# Patient Record
Sex: FEMALE | Race: WHITE | NOT HISPANIC OR LATINO
[De-identification: names, ages, dates, MRNs, and addresses within clinical notes are randomized per-mention and may not be internally consistent; named-entity substitution may affect disease eponyms.]

---

## 2018-06-15 ENCOUNTER — APPOINTMENT (OUTPATIENT)
Dept: OBGYN | Facility: CLINIC | Age: 33
End: 2018-06-15
Payer: COMMERCIAL

## 2018-06-15 ENCOUNTER — RESULT CHARGE (OUTPATIENT)
Age: 33
End: 2018-06-15

## 2018-06-15 ENCOUNTER — NON-APPOINTMENT (OUTPATIENT)
Age: 33
End: 2018-06-15

## 2018-06-15 VITALS
BODY MASS INDEX: 20 KG/M2 | WEIGHT: 132 LBS | HEIGHT: 68 IN | SYSTOLIC BLOOD PRESSURE: 120 MMHG | DIASTOLIC BLOOD PRESSURE: 82 MMHG

## 2018-06-15 DIAGNOSIS — Z78.9 OTHER SPECIFIED HEALTH STATUS: ICD-10-CM

## 2018-06-15 DIAGNOSIS — Z82.49 FAMILY HISTORY OF ISCHEMIC HEART DISEASE AND OTHER DISEASES OF THE CIRCULATORY SYSTEM: ICD-10-CM

## 2018-06-15 DIAGNOSIS — Z86.19 PERSONAL HISTORY OF OTHER INFECTIOUS AND PARASITIC DISEASES: ICD-10-CM

## 2018-06-15 LAB
BILIRUB UR QL STRIP: NORMAL
GLUCOSE UR-MCNC: NORMAL
HCG UR QL: 0.2 EU/DL
HCG UR QL: POSITIVE
HGB UR QL STRIP.AUTO: NORMAL
KETONES UR-MCNC: NORMAL
LEUKOCYTE ESTERASE UR QL STRIP: NORMAL
NITRITE UR QL STRIP: NORMAL
PH UR STRIP: 5.5
PROT UR STRIP-MCNC: NORMAL
SP GR UR STRIP: 1.02

## 2018-06-15 PROCEDURE — 76817 TRANSVAGINAL US OBSTETRIC: CPT

## 2018-06-15 PROCEDURE — 81003 URINALYSIS AUTO W/O SCOPE: CPT | Mod: QW

## 2018-06-15 PROCEDURE — 81025 URINE PREGNANCY TEST: CPT

## 2018-06-15 PROCEDURE — 0501F PRENATAL FLOW SHEET: CPT

## 2018-06-16 LAB
C TRACH RRNA SPEC QL NAA+PROBE: NOT DETECTED
N GONORRHOEA RRNA SPEC QL NAA+PROBE: NOT DETECTED
SOURCE AMPLIFICATION: NORMAL

## 2018-06-18 LAB
BACTERIA UR CULT: NORMAL
SOURCE AMPLIFICATION: NORMAL
T VAGINALIS RRNA SPEC QL NAA+PROBE: NOT DETECTED

## 2018-06-18 RX ORDER — VITAMIN C, CALCIUM, IRON, VITAMIN D3, VITAMIN E, VITAMIN B1, VITAMIN B2, VITAMIN B3, VITAMIN B6, FOLIC ACID, IODINE, ZINC, COPPER, DOCUSATE SODIUM, DOCOSAHEXAENOIC ACID (DHA) 27-1-50 MG
27-1 & 250 KIT ORAL
Qty: 1 | Refills: 11 | Status: DISCONTINUED | COMMUNITY
Start: 2018-06-15 | End: 2018-06-18

## 2018-06-25 DIAGNOSIS — R87.810 ATYPICAL SQUAMOUS CELLS OF UNDETERMINED SIGNIFICANCE ON CYTOLOGIC SMEAR OF CERVIX (ASC-US): ICD-10-CM

## 2018-06-25 DIAGNOSIS — J93.83 OTHER PNEUMOTHORAX: ICD-10-CM

## 2018-06-25 DIAGNOSIS — Z86.39 PERSONAL HISTORY OF OTHER ENDOCRINE, NUTRITIONAL AND METABOLIC DISEASE: ICD-10-CM

## 2018-06-25 DIAGNOSIS — R92.2 INCONCLUSIVE MAMMOGRAM: ICD-10-CM

## 2018-06-25 DIAGNOSIS — R79.89 OTHER SPECIFIED ABNORMAL FINDINGS OF BLOOD CHEMISTRY: ICD-10-CM

## 2018-06-25 DIAGNOSIS — R87.610 ATYPICAL SQUAMOUS CELLS OF UNDETERMINED SIGNIFICANCE ON CYTOLOGIC SMEAR OF CERVIX (ASC-US): ICD-10-CM

## 2018-06-25 DIAGNOSIS — B37.9 CANDIDIASIS, UNSPECIFIED: ICD-10-CM

## 2018-07-06 ENCOUNTER — APPOINTMENT (OUTPATIENT)
Dept: OBGYN | Facility: CLINIC | Age: 33
End: 2018-07-06
Payer: COMMERCIAL

## 2018-07-06 VITALS
DIASTOLIC BLOOD PRESSURE: 60 MMHG | BODY MASS INDEX: 20.16 KG/M2 | SYSTOLIC BLOOD PRESSURE: 100 MMHG | HEIGHT: 68 IN | WEIGHT: 133 LBS

## 2018-07-06 LAB
BILIRUB UR QL STRIP: NORMAL
GLUCOSE UR-MCNC: NORMAL
HCG UR QL: 0.2 EU/DL
HGB UR QL STRIP.AUTO: NORMAL
KETONES UR-MCNC: NORMAL
LEUKOCYTE ESTERASE UR QL STRIP: NORMAL
NITRITE UR QL STRIP: NORMAL
PH UR STRIP: 7
PROT UR STRIP-MCNC: NORMAL
SP GR UR STRIP: 1.01

## 2018-07-06 PROCEDURE — 36415 COLL VENOUS BLD VENIPUNCTURE: CPT

## 2018-07-06 PROCEDURE — 0502F SUBSEQUENT PRENATAL CARE: CPT

## 2018-07-06 PROCEDURE — 76801 OB US < 14 WKS SINGLE FETUS: CPT

## 2018-07-08 ENCOUNTER — NON-APPOINTMENT (OUTPATIENT)
Age: 33
End: 2018-07-08

## 2018-07-08 LAB
BASOPHILS # BLD AUTO: 0.02 K/UL
BASOPHILS NFR BLD AUTO: 0.2 %
CMV IGG SERPL QL: 0.37 U/ML
CMV IGG SERPL-IMP: NEGATIVE
EOSINOPHIL # BLD AUTO: 0.1 K/UL
EOSINOPHIL NFR BLD AUTO: 1.2 %
HBV SURFACE AG SERPL QL IA: NONREACTIVE
HCT VFR BLD CALC: 38.2 %
HCV AB SER QL: NONREACTIVE
HCV S/CO RATIO: 0.28 S/CO
HGB BLD-MCNC: 12.8 G/DL
HIV1+2 AB SPEC QL IA.RAPID: NONREACTIVE
IMM GRANULOCYTES NFR BLD AUTO: 0.6 %
LYMPHOCYTES # BLD AUTO: 2.78 K/UL
LYMPHOCYTES NFR BLD AUTO: 34.1 %
MAN DIFF?: NORMAL
MCHC RBC-ENTMCNC: 31 PG
MCHC RBC-ENTMCNC: 33.5 GM/DL
MCV RBC AUTO: 92.5 FL
MONOCYTES # BLD AUTO: 0.58 K/UL
MONOCYTES NFR BLD AUTO: 7.1 %
NEUTROPHILS # BLD AUTO: 4.63 K/UL
NEUTROPHILS NFR BLD AUTO: 56.8 %
PLATELET # BLD AUTO: 261 K/UL
RBC # BLD: 4.13 M/UL
RBC # FLD: 12.8 %
RUBV IGG FLD-ACNC: 2.2 INDEX
RUBV IGG SER-IMP: POSITIVE
T PALLIDUM AB SER QL IA: NEGATIVE
TSH SERPL-ACNC: 0.57 UIU/ML
VZV AB TITR SER: POSITIVE
VZV IGG SER IF-ACNC: 1324 INDEX
WBC # FLD AUTO: 8.16 K/UL

## 2018-07-09 LAB
ABO + RH PNL BLD: NORMAL
B19V IGG SER QL IA: 3.1 INDEX
B19V IGG+IGM SER-IMP: NORMAL
B19V IGG+IGM SER-IMP: POSITIVE
B19V IGM FLD-ACNC: 0.2 INDEX
B19V IGM SER-ACNC: NEGATIVE
BLD GP AB SCN SERPL QL: NORMAL

## 2018-07-10 LAB
HGB A MFR BLD: 96.9 %
HGB A2 MFR BLD: 2.5 %
HGB F MFR BLD: 0.6 %
HGB FRACT BLD-IMP: NORMAL

## 2018-07-12 LAB
FMR1 GENE MUT ANL BLD/T: NORMAL
LEAD BLD-MCNC: 1 UG/DL

## 2018-07-16 LAB
AR GENE MUT ANL BLD/T: NEGATIVE
CFTR MUT TESTED BLD/T: NEGATIVE

## 2018-07-18 LAB
CLARIM 15Q11.2: NORMAL
CLARIM 1P36: NORMAL
CLARIM 22Q11.2: NORMAL
CLARIM 4P-/WOLF-HIRSCHHORN: NORMAL
CLARIM 5P-/CRI DU CHAT: NORMAL
CLARIM ADDITIONAL INFO: NORMAL
CLARIM CHROMOSOME 13: NORMAL
CLARIM CHROMOSOME 18: NORMAL
CLARIM CHROMOSOME 21: NORMAL
CLARIM SEX CHROMOSOMES: NORMAL
CLARITEST NIPT W/MICRO: NORMAL

## 2018-08-03 ENCOUNTER — APPOINTMENT (OUTPATIENT)
Dept: OBGYN | Facility: CLINIC | Age: 33
End: 2018-08-03
Payer: COMMERCIAL

## 2018-08-03 ENCOUNTER — NON-APPOINTMENT (OUTPATIENT)
Age: 33
End: 2018-08-03

## 2018-08-03 VITALS
HEIGHT: 68 IN | SYSTOLIC BLOOD PRESSURE: 94 MMHG | DIASTOLIC BLOOD PRESSURE: 60 MMHG | BODY MASS INDEX: 20.31 KG/M2 | WEIGHT: 134 LBS

## 2018-08-03 PROCEDURE — 0502F SUBSEQUENT PRENATAL CARE: CPT

## 2018-08-11 ENCOUNTER — EMERGENCY (EMERGENCY)
Facility: HOSPITAL | Age: 33
LOS: 1 days | End: 2018-08-11
Attending: EMERGENCY MEDICINE
Payer: COMMERCIAL

## 2018-08-11 VITALS
DIASTOLIC BLOOD PRESSURE: 79 MMHG | RESPIRATION RATE: 16 BRPM | TEMPERATURE: 98 F | SYSTOLIC BLOOD PRESSURE: 125 MMHG | HEART RATE: 110 BPM | OXYGEN SATURATION: 99 %

## 2018-08-11 LAB
ALBUMIN SERPL ELPH-MCNC: 3.3 G/DL — SIGNIFICANT CHANGE UP (ref 3.3–5)
ALBUMIN SERPL ELPH-MCNC: 4.2 G/DL — SIGNIFICANT CHANGE UP (ref 3.3–5)
ALP SERPL-CCNC: 48 U/L — SIGNIFICANT CHANGE UP (ref 40–120)
ALP SERPL-CCNC: 57 U/L — SIGNIFICANT CHANGE UP (ref 40–120)
ALT FLD-CCNC: 20 U/L — SIGNIFICANT CHANGE UP (ref 10–45)
ALT FLD-CCNC: 21 U/L — SIGNIFICANT CHANGE UP (ref 10–45)
ANION GAP SERPL CALC-SCNC: 12 MMOL/L — SIGNIFICANT CHANGE UP (ref 5–17)
ANION GAP SERPL CALC-SCNC: 13 MMOL/L — SIGNIFICANT CHANGE UP (ref 5–17)
APTT BLD: 25.3 SEC — LOW (ref 27.5–37.4)
AST SERPL-CCNC: 20 U/L — SIGNIFICANT CHANGE UP (ref 10–40)
AST SERPL-CCNC: 21 U/L — SIGNIFICANT CHANGE UP (ref 10–40)
BASOPHILS # BLD AUTO: 0.1 K/UL — SIGNIFICANT CHANGE UP (ref 0–0.2)
BASOPHILS NFR BLD AUTO: 0.7 % — SIGNIFICANT CHANGE UP (ref 0–2)
BILIRUB SERPL-MCNC: 0.3 MG/DL — SIGNIFICANT CHANGE UP (ref 0.2–1.2)
BILIRUB SERPL-MCNC: 0.3 MG/DL — SIGNIFICANT CHANGE UP (ref 0.2–1.2)
BLD GP AB SCN SERPL QL: NEGATIVE — SIGNIFICANT CHANGE UP
BUN SERPL-MCNC: 11 MG/DL — SIGNIFICANT CHANGE UP (ref 7–23)
BUN SERPL-MCNC: 7 MG/DL — SIGNIFICANT CHANGE UP (ref 7–23)
CALCIUM SERPL-MCNC: 8.6 MG/DL — SIGNIFICANT CHANGE UP (ref 8.4–10.5)
CALCIUM SERPL-MCNC: 9 MG/DL — SIGNIFICANT CHANGE UP (ref 8.4–10.5)
CHLORIDE SERPL-SCNC: 104 MMOL/L — SIGNIFICANT CHANGE UP (ref 96–108)
CHLORIDE SERPL-SCNC: 97 MMOL/L — SIGNIFICANT CHANGE UP (ref 96–108)
CK MB CFR SERPL CALC: 1 NG/ML — SIGNIFICANT CHANGE UP (ref 0–3.8)
CK SERPL-CCNC: 42 U/L — SIGNIFICANT CHANGE UP (ref 25–170)
CO2 SERPL-SCNC: 20 MMOL/L — LOW (ref 22–31)
CO2 SERPL-SCNC: 20 MMOL/L — LOW (ref 22–31)
CREAT SERPL-MCNC: 0.46 MG/DL — LOW (ref 0.5–1.3)
CREAT SERPL-MCNC: 0.49 MG/DL — LOW (ref 0.5–1.3)
EOSINOPHIL # BLD AUTO: 0.2 K/UL — SIGNIFICANT CHANGE UP (ref 0–0.5)
EOSINOPHIL NFR BLD AUTO: 2 % — SIGNIFICANT CHANGE UP (ref 0–6)
GLUCOSE SERPL-MCNC: 100 MG/DL — HIGH (ref 70–99)
GLUCOSE SERPL-MCNC: 108 MG/DL — HIGH (ref 70–99)
HCT VFR BLD CALC: 37.4 % — SIGNIFICANT CHANGE UP (ref 34.5–45)
HGB BLD-MCNC: 13.1 G/DL — SIGNIFICANT CHANGE UP (ref 11.5–15.5)
INR BLD: 0.93 RATIO — SIGNIFICANT CHANGE UP (ref 0.88–1.16)
LYMPHOCYTES # BLD AUTO: 2.5 K/UL — SIGNIFICANT CHANGE UP (ref 1–3.3)
LYMPHOCYTES # BLD AUTO: 32.8 % — SIGNIFICANT CHANGE UP (ref 13–44)
MCHC RBC-ENTMCNC: 32.3 PG — SIGNIFICANT CHANGE UP (ref 27–34)
MCHC RBC-ENTMCNC: 35 GM/DL — SIGNIFICANT CHANGE UP (ref 32–36)
MCV RBC AUTO: 92.2 FL — SIGNIFICANT CHANGE UP (ref 80–100)
MONOCYTES # BLD AUTO: 0.5 K/UL — SIGNIFICANT CHANGE UP (ref 0–0.9)
MONOCYTES NFR BLD AUTO: 6.3 % — SIGNIFICANT CHANGE UP (ref 2–14)
NEUTROPHILS # BLD AUTO: 4.5 K/UL — SIGNIFICANT CHANGE UP (ref 1.8–7.4)
NEUTROPHILS NFR BLD AUTO: 58.2 % — SIGNIFICANT CHANGE UP (ref 43–77)
PLATELET # BLD AUTO: 230 K/UL — SIGNIFICANT CHANGE UP (ref 150–400)
POTASSIUM SERPL-MCNC: 3.3 MMOL/L — LOW (ref 3.5–5.3)
POTASSIUM SERPL-MCNC: 3.6 MMOL/L — SIGNIFICANT CHANGE UP (ref 3.5–5.3)
POTASSIUM SERPL-SCNC: 3.3 MMOL/L — LOW (ref 3.5–5.3)
POTASSIUM SERPL-SCNC: 3.6 MMOL/L — SIGNIFICANT CHANGE UP (ref 3.5–5.3)
PROT SERPL-MCNC: 6.4 G/DL — SIGNIFICANT CHANGE UP (ref 6–8.3)
PROT SERPL-MCNC: 7.4 G/DL — SIGNIFICANT CHANGE UP (ref 6–8.3)
PROTHROM AB SERPL-ACNC: 10 SEC — SIGNIFICANT CHANGE UP (ref 9.8–12.7)
RBC # BLD: 4.05 M/UL — SIGNIFICANT CHANGE UP (ref 3.8–5.2)
RBC # FLD: 12.3 % — SIGNIFICANT CHANGE UP (ref 10.3–14.5)
RH IG SCN BLD-IMP: POSITIVE — SIGNIFICANT CHANGE UP
SODIUM SERPL-SCNC: 129 MMOL/L — LOW (ref 135–145)
SODIUM SERPL-SCNC: 137 MMOL/L — SIGNIFICANT CHANGE UP (ref 135–145)
TROPONIN T, HIGH SENSITIVITY RESULT: <6 NG/L — SIGNIFICANT CHANGE UP (ref 0–51)
WBC # BLD: 7.7 K/UL — SIGNIFICANT CHANGE UP (ref 3.8–10.5)
WBC # FLD AUTO: 7.7 K/UL — SIGNIFICANT CHANGE UP (ref 3.8–10.5)

## 2018-08-11 PROCEDURE — 71045 X-RAY EXAM CHEST 1 VIEW: CPT | Mod: 26

## 2018-08-11 PROCEDURE — 93010 ELECTROCARDIOGRAM REPORT: CPT

## 2018-08-11 PROCEDURE — 70450 CT HEAD/BRAIN W/O DYE: CPT | Mod: 26

## 2018-08-11 PROCEDURE — 99220: CPT

## 2018-08-11 RX ORDER — METOCLOPRAMIDE HCL 10 MG
10 TABLET ORAL ONCE
Qty: 0 | Refills: 0 | Status: COMPLETED | OUTPATIENT
Start: 2018-08-11 | End: 2018-08-11

## 2018-08-11 RX ORDER — SODIUM CHLORIDE 9 MG/ML
1000 INJECTION INTRAMUSCULAR; INTRAVENOUS; SUBCUTANEOUS ONCE
Qty: 0 | Refills: 0 | Status: COMPLETED | OUTPATIENT
Start: 2018-08-11 | End: 2018-08-11

## 2018-08-11 RX ADMIN — SODIUM CHLORIDE 1000 MILLILITER(S): 9 INJECTION INTRAMUSCULAR; INTRAVENOUS; SUBCUTANEOUS at 15:41

## 2018-08-11 RX ADMIN — Medication 10 MILLIGRAM(S): at 13:43

## 2018-08-11 NOTE — ED CDU PROVIDER DISPOSITION NOTE - ATTENDING CONTRIBUTION TO CARE
Patient appears very well and comfortable. Exam benign. Imaging studies completed. Cleared by Neurology. Not in need of any additional emergent investigation or intervention at this time. Discharged with appropriate instruction, follow-up and a copy of her test results.

## 2018-08-11 NOTE — ED ADULT NURSE NOTE - ED STAT RN HANDOFF DETAILS
Bedside report given to on coming nurse Jadyon RN. Understands pmh, medications given and plan of care for patient. Patient in stable condition, vital signs updated, has no complaints at this time and has been updated on care plan. Explained to patient that it is change of shift and new nurse is taking over, pt verbalized understanding.

## 2018-08-11 NOTE — ED PROVIDER NOTE - PHYSICAL EXAMINATION
Clarita Louis, DO:   Gen: Well appearing, NAD  Head: NCAT  HEENT: PERRL, MMM, normal conjunctiva, anicteric, neck supple  Lung: CTAB, no adventitious sounds  CV: RRR, no murmurs  Abd: soft, NTND, no rebound or guarding, no CVAT  MSK: No edema, no visible deformities  Neuro: CN II-XII intact. 5/5 strength and normal sensation in all extremities. No dysmetria, no drift.   Skin: Warm and dry, no evidence of rash  Psych: normal mood and affect Clarita Louis, DO:   Gen: Well appearing, NAD  Head: NCAT  HEENT: PERRL, MMM, normal conjunctiva, anicteric, neck supple  Lung: CTAB, no adventitious sounds  CV: RRR, no murmurs  Abd: soft, NTND, no rebound or guarding, no CVAT  MSK: No edema, no visible deformities  Neuro: CN II-XII intact. 5/5 strength and normal sensation in all extremities. No dysmetria, no drift. NIHSS 1.   Skin: Warm and dry, no evidence of rash  Psych: normal mood and affect

## 2018-08-11 NOTE — ED CDU PROVIDER INITIAL DAY NOTE - PROGRESS NOTE DETAILS
CDU NOTE GALINA Whitley: VSS NAD. Patient is resting comfortably and is without any complaints. No acute events on tele. Neuro exam unchanged

## 2018-08-11 NOTE — ED ADULT NURSE NOTE - CHPI ED NUR SYMPTOMS NEG
no fever/no loss of consciousness/no vomiting/no blurred vision/no change in level of consciousness/no nausea/no dizziness/no confusion

## 2018-08-11 NOTE — ED ADULT NURSE REASSESSMENT NOTE - COMFORT CARE
darkened lights/po fluids offered/plan of care explained/ambulated to bathroom/repositioned/warm blanket provided

## 2018-08-11 NOTE — CONSULT NOTE ADULT - SUBJECTIVE AND OBJECTIVE BOX
HPI:  Patient is a 33 year old RH  female who presents with LUE and LLE tingling that began approx half hour prior to presentation as code stroke. she has PMHx of migraines and states she had headache last night and woke up with only headache this morning. She is 16 weeks pregnant. Has unsteadiness with walking, and states that both the tingling and walking have improved but still not back to baseline. Denies numbness, dizziness, change in vision, nausea, vomiting, diarrhea, recent illness or trauma. CT head read as negative, tPa not given due to improving symptoms. discussed with stroke attending, and risks vs benefits explained to patient who agreed with plan.   NIHSS 1, pre-MRS 0.     PAST MEDICAL & SURGICAL HISTORY:  No pertinent past medical history    FAMILY HISTORY:    Allergies  No Known Allergies    Review of Systems:  CONSTITUTIONAL:  No weight loss, fever, chills, weakness or fatigue.  HEENT:  Eyes:  No visual loss, blurred vision, double vision or yellow sclerae. Ears, Nose, Throat:  No hearing loss, sneezing, congestion, runny nose or sore throat.  CARDIOVASCULAR:  No chest pain, chest pressure or chest discomfort. No palpitations or edema.  GASTROINTESTINAL:  No anorexia, nausea, vomiting or diarrhea. No abdominal pain or blood.  NEUROLOGICAL: See HPI  MUSCULOSKELETAL:  No muscle, back pain, joint pain or stiffness.  PSYCHIATRIC:  No history of depression or anxiety.    Vital Signs Last 24 Hrs  T(C): 36.6 (11 Aug 2018 13:19), Max: 36.6 (11 Aug 2018 13:19)  T(F): 97.9 (11 Aug 2018 13:19), Max: 97.9 (11 Aug 2018 13:19)  HR: 95 (11 Aug 2018 13:26) (95 - 110)  BP: 119/86 (11 Aug 2018 13:26) (119/86 - 125/79)  BP(mean): --  RR: 16 (11 Aug 2018 13:26) (16 - 16)  SpO2: 100% (11 Aug 2018 13:26) (99% - 100%)    General Exam:   General appearance: No acute distress                 Neurological Exam:  Mental Status: Orientated to self, date and place.    No dysarthria, aphasia or neglect.      Cranial Nerves: CN I - not tested.  PERRL, EOMI, VFF, no nystagmus or diplopia.  No APD.    Fundi not visualized bilaterally.    No facial asymmetry.  Hearing intact to finger rub bilaterally.     Motor:   Tone: normal.                  Strength:     [] Upper extremity                      Delt       Bicep    Tricep                                                  R         5/5        5/5        5/5       5/5                                               L          5/5        5/5        5/5       5/5  [] Lower extremity                       HF          KE          KF        DF         PF                                               R        5/5 5/5        5/5       5/5       5/5                                               L         5/5 5/5 5/5       5/5        5/5  Pronator drift: none                 Dysmetria: BL NL FTN and heel to shin  No truncal ataxia.    Tremor: No resting, postural or action tremor.  No myoclonus.    Sensation: intact to light touch, pinprick, vibration and proprioception    Deep Tendon Reflexes:   Right 2+ : BC, TC, BRD   Left 2+ : BC, TC, BRD  Right 2+ Knee, 1+ ankle  Left 2+ Knee, 1+ ankle    Toes flexor bilaterally  Gait: unsteady due to slight left lower extremity weakness, but improving.     Other:    Radiology  CT head: negative  MRI  EKG:  tele:  TTE:  EEG: HPI:  Patient is a 33 year old RH  female who presents with LUE and LLE tingling that began approx half hour prior to presentation as code stroke. she has PMHx of migraines and states she had headache last night and woke up with only headache this morning. She is 16 weeks pregnant. Has unsteadiness with walking, and states that both the tingling and walking have improved but still not back to baseline. Denies numbness, dizziness, change in vision, nausea, vomiting, diarrhea, recent illness or trauma. CT head read as negative, tPa not given due to improving symptoms. discussed with stroke attending, and risks vs benefits explained to patient who agreed with plan.   NIHSS 1, pre-MRS 0.     PAST MEDICAL & SURGICAL HISTORY:  No pertinent past medical history    FAMILY HISTORY:    Allergies  No Known Allergies    Review of Systems:  CONSTITUTIONAL:  No weight loss, fever, chills, weakness or fatigue.  HEENT:  Eyes:  No visual loss, blurred vision, double vision or yellow sclerae. Ears, Nose, Throat:  No hearing loss, sneezing, congestion, runny nose or sore throat.  CARDIOVASCULAR:  No chest pain, chest pressure or chest discomfort. No palpitations or edema.  GASTROINTESTINAL:  No anorexia, nausea, vomiting or diarrhea. No abdominal pain or blood.  NEUROLOGICAL: See HPI  MUSCULOSKELETAL:  No muscle, back pain, joint pain or stiffness.  PSYCHIATRIC:  No history of depression or anxiety.    Vital Signs Last 24 Hrs  T(C): 36.6 (11 Aug 2018 13:19), Max: 36.6 (11 Aug 2018 13:19)  T(F): 97.9 (11 Aug 2018 13:19), Max: 97.9 (11 Aug 2018 13:19)  HR: 95 (11 Aug 2018 13:26) (95 - 110)  BP: 119/86 (11 Aug 2018 13:26) (119/86 - 125/79)  BP(mean): --  RR: 16 (11 Aug 2018 13:26) (16 - 16)  SpO2: 100% (11 Aug 2018 13:26) (99% - 100%)    General Exam:   General appearance: No acute distress                 Neurological Exam:   Mental Status: Orientated to self, date and place.    No dysarthria, aphasia or neglect.      Cranial Nerves: CN I - not tested.  PERRL, EOMI, VFF, no nystagmus or diplopia.  No APD.    Fundi not visualized bilaterally.    No facial asymmetry.  Hearing intact to finger rub bilaterally.     Motor:   Tone: normal.                  Strength:     [] Upper extremity                      Delt       Bicep    Tricep                                                  R         5/5        5/5        5/5       5/5                                               L          5/5        5/5        5/5       5/5  [] Lower extremity                       HF          KE          KF        DF         PF                                               R        5/5 5/5        5/5       5/5       5/5                                               L         5/5 5/5 5/5       5/5        5/5  Pronator drift: none                 Dysmetria: BL NL FTN and heel to shin  No truncal ataxia.    Tremor: No resting, postural or action tremor.  No myoclonus.    Sensation: intact to light touch, pinprick, vibration and proprioception    Deep Tendon Reflexes:   Right 2+ : BC, TC, BRD   Left 2+ : BC, TC, BRD  Right 2+ Knee, 1+ ankle  Left 2+ Knee, 1+ ankle    Toes flexor bilaterally  Gait: unsteady due to slight left lower extremity weakness, but improving.     Other:    Radiology  CT head: negative  MRI  EKG:  tele:  TTE:  EEG:

## 2018-08-11 NOTE — CONSULT NOTE ADULT - ASSESSMENT
Patient is a 33 year old RH  female who presents with LUE and LLE tingling that began approx half hour prior to presentation as code stroke. she has PMHx of migraines and states she had headache last night and woke up with only headache this morning. She is 16 weeks pregnant. Has unsteadiness with walking, and states that both the tingling and walking have improved but still not back to baseline. Denies numbness, dizziness, change in vision, nausea, vomiting, diarrhea, recent illness or trauma. CT head read as negative, tPa not given due to improving symptoms. discussed with stroke attending, and risks vs benefits explained to patient who agreed with plan.   NIHSS 1, pre-MRS 0.   Etiology likely migraine vs less likely stroke.    Plan  [] CDU  [] MRI brain, MRA head and neck WITHOUT contrast, MRV without contrast  [] stat CT head if mentation changes or if has decline on exam.   [] neuro checks  [] resume home medications  [] bedside swallow evaluation  [] PT/OT Patient is a 33 year old RH  female who presents with LUE and LLE tingling that began approx half hour prior to presentation as code stroke. she has PMHx of migraines and states she had headache last night and woke up with only headache this morning. She is 16 weeks pregnant. Has unsteadiness with walking, and states that both the tingling and walking have improved but still not back to baseline. Denies numbness, dizziness, change in vision, nausea, vomiting, diarrhea, recent illness or trauma. CT head read as negative, tPa not given due to improving symptoms. discussed with stroke attending, and risks vs benefits explained to patient who agreed with plan.   NIHSS 1, pre-MRS 0.   Etiology likely migraine vs less likely stroke.    Plan  [] CDU  [] MRI brain, MRA head and neck WITHOUT contrast, MRV without contrast  [] migraine headache: IVF, magnesium, tylenol  [] stat CT head if mentation changes or if has decline on exam.   [] neuro checks  [] resume home medications  [] bedside swallow evaluation  [] PT/OT

## 2018-08-11 NOTE — ED PROVIDER NOTE - OBJECTIVE STATEMENT
Clarita Louis, DO: 33 16 weeks F pregnant F with hx of migraines here for LUE & LLE weakness and numbness associated with HA 30 mins pta. No hx of trauma. No nausea/vomiting. FS 99. Clarita Louis, DO: 33 R hand dominate 16 weeks F pregnant F with hx of migraines here for LUE & LLE weakness and numbness associated with HA 30 mins pta. No hx of trauma. No nausea/vomiting. FS 99.

## 2018-08-11 NOTE — ED CDU PROVIDER DISPOSITION NOTE - PLAN OF CARE
1) Follow-up with your primary medical doctor in 2-3 days. Additionally follow-up with neurology in 2-3 days, you may follow-up with the neurology clinic [phone].  2) Take all medication as directed.  3) Rest and drink plenty of fluids.  4) Return to the ER if symptoms persist or worsen, or if you experience weakness, numbness, difficulty speaking/swallowing/walking, dizziness, lightheadedness, passing out (losing consciousness), vomiting, severe headache, change in vision, or if any other concerning symptoms.

## 2018-08-11 NOTE — ED PROVIDER NOTE - ATTENDING CONTRIBUTION TO CARE
32 yo female hx of Migraines presents to ED for left sided weakness and decreased sensation x 30 min prior to arrival. Code stroke called upon arrival. Pt endorses mild HA that she woke up with the morning. Pt 16 weeks pregnant and only taking tylenol at home for migraines without relief. No hx of similar type migraines in the past, usually just HA that resolves with medication. Pt denies trauma. Symptoms improving but still present, difficulty walking present. No fevers, no neck pain, no chest pain. PE: Anxious but well appearing, MMM, RRR, lungs clear, Ab soft, gravid uterus, nontender, Strength intact b/l. Decreased sensation to LUE and LLE, difficulty ambulating. A/P: 32 yo female presents with left sided subjective weakness and numbness x 30 min. CODE stroke called. Likely migraine however will obtain CT scan and appreciate neuro recs, likely needing MRI. Will also obtain FS, labs, analgesia, FHR and reevaluate.

## 2018-08-11 NOTE — ED PROVIDER NOTE - CARE PLAN
Principal Discharge DX:	Weakness Principal Discharge DX:	Hemiplegic migraine without status migrainosus, not intractable

## 2018-08-11 NOTE — ED ADULT NURSE REASSESSMENT NOTE - NS ED NURSE REASSESS COMMENT FT1
18.45 Received the PT from CC from [facility] For  weakness. Pt is oriented to the unit Uzma headache /N/V/Fever  chills cp sob  Pt feels better with left sided weakness Comfort care & safety measures initiated  continue to monitor
Received pt from [facility],  received pt alert and responsive, oriented x4, denies any respiratory distress, SOB, or difficulty breathing. Pt transferred to CDU for L sided numbness which has since resolved also c/o mild headache but declined pain medication at this ti me. Neuro intact no deficits noted. P tis pending MRI/ MRA, MRV. On tele SR on monitor hr: 80's.  IV in place, patent and free of signs of infiltration, pt denies chest pain or palpitations, V/S stable, pt afebrile,  Pt educated on unit and unit rules, instructed patient to notify RN of any needed assistance, Pt verbalizes understanding, Call bell placed within reach. Safety maintained. Will continue to monitor. Family at bedside.

## 2018-08-11 NOTE — ED CDU PROVIDER INITIAL DAY NOTE - FAMILY HISTORY
Mother  Still living? Unknown  Family history of migraine, Age at diagnosis: Age Unknown  Family history of acute myocardial infarction, Age at diagnosis: Age Unknown

## 2018-08-11 NOTE — ED CDU PROVIDER INITIAL DAY NOTE - OBJECTIVE STATEMENT
34 y/o F  16 wks pregnant, R hand dominant, with h/o migraines (saw neuro 2 years ago, no regular f/u, only uses OTC meds with minimal relief) and spontaneous PTX in  presents to ED c/o L sided paresthesias (face, LUE and LLE), LLE/LUE weakness without facial droop, difficulty ambulating, and change in vision in L eye lasting approximately 2 hours and then resolving in the ED without intervention. Pt also had onset of her typical R sided migraine HA about 30 minutes after onset of other sxs described as in her R neck radiating up to R temple. At the time of CDU PA interview pt had only 4/10 dull HA as described previously without photophobia or nuchal rigidity, no palliative or provoking factors. Pt denies f/c, trauma/falls, CP, abdo pain, back pain, n/v, difficulty speaking/swallowing, dizziness, lightheadedness, LOC, difficulty urinating, current numbness/paresthesias/weakness or difficulty ambulating, h/o cardiac dysrhythmia, personal or FH/o CVA.  In ED: Pt was code stroke. CBC wnl, coags and CMP non-actionable, CTH wnl, CXR wnl. Sent to CDU as per neuro for MRI/MRA/MRV, PT/OT eval, neuro checks and frequent eval.

## 2018-08-11 NOTE — ED PROVIDER NOTE - PROGRESS NOTE DETAILS
Clarita Louis DO: Patient consent for CT - risk/benefits discussed and pt consented.  Attending Marcello Shane DO

## 2018-08-11 NOTE — ED CDU PROVIDER INITIAL DAY NOTE - ATTENDING CONTRIBUTION TO CARE
32 yo female presents with left sided weakness and numbness, CODE stroke called upon arrival. Symptoms improving. Likely miragine. CT neg, labs unremarkable. Placed in CDU for MRI/MRA/MRV and continued monitoring/neuro checks. If MR negative and patient improved, will discharge with neuro follow up.

## 2018-08-11 NOTE — ED ADULT NURSE NOTE - OBJECTIVE STATEMENT
34 y/o female presents to ed c/o sudden onset of left sided weakness/ pins and needles feeling 30 minutes PTA. Hx of migraines. 16 weeks pregnant. States she woke up with headache no different then normal. Code stroke initiated at charge 13:22. Please refer to neuro flow sheet. Denies chest pain, sob, n/v/d, abdominal pain, f/c, urinary symptoms, hematuria. A&Ox4, vss, skin warm dry and intact, MAEx4, lungs CTA, abd soft nondistended. Pt resting comfortably with VSS, no complaints at this time. Patient's bed in the lowest position, explained plan of care to patient and family members. Will continue to reassess. 34 y/o female presents to ed c/o sudden onset of left sided weakness/ pins and needles feeling 30 minutes PTA. Hx of migraines. 16 weeks pregnant. States she woke up with headache no different then normal. Code stroke initiated at charge 13:22. Please refer to neuro flow sheet. Denies chest pain, sob, n/v/d, abdominal pain, f/c, urinary symptoms, hematuria. A&Ox4, vss, skin warm dry and intact, MAEx4, lungs CTA, abd soft nondistended. Pt resting comfortably with VSS, no complaints at this time. Patient's bed in the lowest position, explained plan of care to patient and family members. Will continue to reassess.  - please refer to neuro flow sheet

## 2018-08-11 NOTE — ED CDU PROVIDER DISPOSITION NOTE - CLINICAL COURSE
32 y/o F  16 wks pregnant, R hand dominant, with h/o migraines (saw neuro 2 years ago, no regular f/u, only uses OTC meds with minimal relief) and spontaneous PTX in  presents to ED c/o L sided paresthesias (face, LUE and LLE), LLE/LUE weakness without facial droop, difficulty ambulating, and change in vision in L eye lasting approximately 2 hours and then resolving in the ED without intervention. Pt also had onset of her typical R sided migraine HA about 30 minutes after onset of other sxs described as in her R neck radiating up to R temple. At the time of CDU PA interview pt had only 4/10 dull HA as described previously without photophobia or nuchal rigidity, no palliative or provoking factors. Pt denies f/c, trauma/falls, CP, abdo pain, back pain, n/v, difficulty speaking/swallowing, dizziness, lightheadedness, LOC, difficulty urinating, current numbness/paresthesias/weakness or difficulty ambulating, h/o cardiac dysrhythmia, personal or FH/o CVA.  In ED: Pt was code stroke. CBC wnl, coags and CMP non-actionable, CTH wnl, CXR wnl. Sent to CDU as per neuro for MRI/MRA/MRV, PT/OT eval, neuro checks and frequent eval. 34 y/o F  16 wks pregnant, R hand dominant, with h/o migraines (saw neuro 2 years ago, no regular f/u, only uses OTC meds with minimal relief) and spontaneous PTX in  presents to ED c/o L sided paresthesias (face, LUE and LLE), LLE/LUE weakness without facial droop, difficulty ambulating, and change in vision in L eye lasting approximately 2 hours and then resolving in the ED without intervention. Pt also had onset of her typical R sided migraine HA about 30 minutes after onset of other sxs described as in her R neck radiating up to R temple. At the time of CDU PA interview pt had only 4/10 dull HA as described previously without photophobia or nuchal rigidity, no palliative or provoking factors. Pt denies f/c, trauma/falls, CP, abdo pain, back pain, n/v, difficulty speaking/swallowing, dizziness, lightheadedness, LOC, difficulty urinating, current numbness/paresthesias/weakness or difficulty ambulating, h/o cardiac dysrhythmia, personal or FH/o CVA.  In ED: Pt was code stroke. CBC wnl, coags and CMP non-actionable, CTH wnl, CXR wnl. Sent to CDU as per neuro for MRI/MRA/MRV, neuro checks and frequent eval. MR showed ventricles and sulci are unremarkable, there are few tiny foci of hyperintense T2 signal in the white matter likely minimal microvascular disease or sequela of migraines. No hemorrhage or midline shift. MR venography demonstrates patency of the superior sagittal sinus, straight sinus, paired transverse and sigmoid sinus. Intracranial/extracranial MR angio unremarkable. Case discussed with stroke team Dr. Julio C Torres spectra [id number] who recommended patient safe to be discharged home with neuro follow up. ED attending Dr. Boggs aware of recommendations and agreed

## 2018-08-12 VITALS
HEART RATE: 96 BPM | TEMPERATURE: 99 F | RESPIRATION RATE: 18 BRPM | SYSTOLIC BLOOD PRESSURE: 110 MMHG | DIASTOLIC BLOOD PRESSURE: 79 MMHG | OXYGEN SATURATION: 100 %

## 2018-08-12 LAB
CHOLEST SERPL-MCNC: 245 MG/DL — HIGH (ref 10–199)
HBA1C BLD-MCNC: 4.8 % — SIGNIFICANT CHANGE UP (ref 4–5.6)
HDLC SERPL-MCNC: 92 MG/DL — SIGNIFICANT CHANGE UP (ref 40–125)
LIPID PNL WITH DIRECT LDL SERPL: 140 MG/DL — HIGH
TOTAL CHOLESTEROL/HDL RATIO MEASUREMENT: 2.7 RATIO — LOW (ref 3.3–7.1)
TRIGL SERPL-MCNC: 66 MG/DL — SIGNIFICANT CHANGE UP (ref 10–149)

## 2018-08-12 PROCEDURE — 71045 X-RAY EXAM CHEST 1 VIEW: CPT

## 2018-08-12 PROCEDURE — 80061 LIPID PANEL: CPT

## 2018-08-12 PROCEDURE — G0378: CPT

## 2018-08-12 PROCEDURE — 93005 ELECTROCARDIOGRAM TRACING: CPT

## 2018-08-12 PROCEDURE — 99217: CPT

## 2018-08-12 PROCEDURE — 85027 COMPLETE CBC AUTOMATED: CPT

## 2018-08-12 PROCEDURE — 83036 HEMOGLOBIN GLYCOSYLATED A1C: CPT

## 2018-08-12 PROCEDURE — 84484 ASSAY OF TROPONIN QUANT: CPT

## 2018-08-12 PROCEDURE — 82550 ASSAY OF CK (CPK): CPT

## 2018-08-12 PROCEDURE — 70551 MRI BRAIN STEM W/O DYE: CPT

## 2018-08-12 PROCEDURE — 86850 RBC ANTIBODY SCREEN: CPT

## 2018-08-12 PROCEDURE — 96374 THER/PROPH/DIAG INJ IV PUSH: CPT

## 2018-08-12 PROCEDURE — 70450 CT HEAD/BRAIN W/O DYE: CPT

## 2018-08-12 PROCEDURE — 86901 BLOOD TYPING SEROLOGIC RH(D): CPT

## 2018-08-12 PROCEDURE — 82553 CREATINE MB FRACTION: CPT

## 2018-08-12 PROCEDURE — 85610 PROTHROMBIN TIME: CPT

## 2018-08-12 PROCEDURE — 99284 EMERGENCY DEPT VISIT MOD MDM: CPT | Mod: 25

## 2018-08-12 PROCEDURE — 86900 BLOOD TYPING SEROLOGIC ABO: CPT

## 2018-08-12 PROCEDURE — 80053 COMPREHEN METABOLIC PANEL: CPT

## 2018-08-12 PROCEDURE — 70551 MRI BRAIN STEM W/O DYE: CPT | Mod: 26

## 2018-08-12 PROCEDURE — 70547 MR ANGIOGRAPHY NECK W/O DYE: CPT

## 2018-08-12 PROCEDURE — 82962 GLUCOSE BLOOD TEST: CPT

## 2018-08-12 PROCEDURE — 70544 MR ANGIOGRAPHY HEAD W/O DYE: CPT

## 2018-08-12 PROCEDURE — 70547 MR ANGIOGRAPHY NECK W/O DYE: CPT | Mod: 26

## 2018-08-12 PROCEDURE — 85730 THROMBOPLASTIN TIME PARTIAL: CPT

## 2018-08-12 RX ORDER — ACETAMINOPHEN 500 MG
650 TABLET ORAL ONCE
Qty: 0 | Refills: 0 | Status: COMPLETED | OUTPATIENT
Start: 2018-08-12 | End: 2018-08-12

## 2018-08-12 RX ADMIN — Medication 650 MILLIGRAM(S): at 00:32

## 2018-08-12 RX ADMIN — Medication 650 MILLIGRAM(S): at 01:18

## 2018-08-12 NOTE — ED CDU PROVIDER SUBSEQUENT DAY NOTE - PHYSICAL EXAMINATION
CONSTITUTIONAL: Patient is awake, alert and oriented x 3. Patient is well appearing and in no acute distress.  HEAD: NCAT,   EYES: PERRL b/l, EOMI,   ENT:Airway patent, Nasal mucosa clear. Mouth with normal mucosa. Throat has no vesicles, no oropharyngeal exudates and uvula is midline.  NECK: supple,   LUNGS: CTA B/L,  HEART: RRR.+S1S2 no murmurs,   ABDOMEN: Soft nd/nt+bs no rebound or guarding.   EXTREMITY: no edema or calf tenderness b/l, FROM upper and lower ext b/l  SKIN: with no rash or lesions.   NEURO: Cn3-12 grossly intact. Strength5/5UE/LE.NmlSensation.Gait normal.

## 2018-08-12 NOTE — ED CDU PROVIDER SUBSEQUENT DAY NOTE - HISTORY
CDU NOTE GALINA Whitley: VSS NAD. Patient is resting comfortably and is without any complaints. No acute events on tele. Neuro exam remains unchanged. Pending MRI/MRA/MRV in am. Neurology following

## 2018-08-12 NOTE — ED CDU PROVIDER SUBSEQUENT DAY NOTE - PROGRESS NOTE DETAILS
CDU NOTE GALINA Whitley: VSS NAD. Patient is resting comfortably and is without any complaints. No acute events on tele. Neuro exam remains unchanged GALINA Valerio: Patient seen at bedside in NAD.  VSS.  Patient resting comfortably without complaints. Neurosensory exam intact, no defecits. Patient stated left sided numbness has resolved. Patient pending MRI and neuro recommendations GALINA Valerio: Case discussed with stroke team Dr. Julio C Torres spectra [phone] who recommended patient safe to be discharged home with neuro follow up. ED attending Dr. Boggs aware of recommendations and agreed.

## 2018-08-31 ENCOUNTER — APPOINTMENT (OUTPATIENT)
Dept: OBGYN | Facility: CLINIC | Age: 33
End: 2018-08-31
Payer: COMMERCIAL

## 2018-08-31 VITALS
BODY MASS INDEX: 21.07 KG/M2 | SYSTOLIC BLOOD PRESSURE: 120 MMHG | WEIGHT: 139 LBS | HEIGHT: 68 IN | DIASTOLIC BLOOD PRESSURE: 76 MMHG

## 2018-08-31 LAB
BILIRUB UR QL STRIP: NORMAL
GLUCOSE UR-MCNC: NORMAL
HCG UR QL: 0.2 EU/DL
HGB UR QL STRIP.AUTO: NORMAL
KETONES UR-MCNC: NORMAL
LEUKOCYTE ESTERASE UR QL STRIP: NORMAL
NITRITE UR QL STRIP: NORMAL
PH UR STRIP: 6
PROT UR STRIP-MCNC: NORMAL
SP GR UR STRIP: 1

## 2018-08-31 PROCEDURE — 0502F SUBSEQUENT PRENATAL CARE: CPT

## 2018-09-02 ENCOUNTER — NON-APPOINTMENT (OUTPATIENT)
Age: 33
End: 2018-09-02

## 2018-09-04 LAB
2ND TRIMESTER DATA: NORMAL
AFP PNL SERPL: NORMAL
AFP SERPL-ACNC: NORMAL
CLINICAL BIOCHEMIST REVIEW: NORMAL
NOTES NTD: NORMAL

## 2018-09-11 ENCOUNTER — ASOB RESULT (OUTPATIENT)
Age: 33
End: 2018-09-11

## 2018-09-11 ENCOUNTER — APPOINTMENT (OUTPATIENT)
Dept: ANTEPARTUM | Facility: CLINIC | Age: 33
End: 2018-09-11
Payer: COMMERCIAL

## 2018-09-11 PROCEDURE — 76805 OB US >/= 14 WKS SNGL FETUS: CPT

## 2018-10-02 ENCOUNTER — APPOINTMENT (OUTPATIENT)
Dept: OBGYN | Facility: CLINIC | Age: 33
End: 2018-10-02
Payer: COMMERCIAL

## 2018-10-02 VITALS
BODY MASS INDEX: 22.58 KG/M2 | HEIGHT: 68 IN | SYSTOLIC BLOOD PRESSURE: 110 MMHG | WEIGHT: 149 LBS | DIASTOLIC BLOOD PRESSURE: 70 MMHG

## 2018-10-02 DIAGNOSIS — Z34.01 ENCOUNTER FOR SUPERVISION OF NORMAL FIRST PREGNANCY, FIRST TRIMESTER: ICD-10-CM

## 2018-10-02 LAB
BILIRUB UR QL STRIP: NORMAL
GLUCOSE UR-MCNC: NORMAL
HCG UR QL: 0.2 EU/DL
HGB UR QL STRIP.AUTO: NORMAL
KETONES UR-MCNC: NORMAL
LEUKOCYTE ESTERASE UR QL STRIP: NORMAL
NITRITE UR QL STRIP: NORMAL
PH UR STRIP: 6.5
PROT UR STRIP-MCNC: NORMAL
SP GR UR STRIP: 1

## 2018-10-02 PROCEDURE — 90656 IIV3 VACC NO PRSV 0.5 ML IM: CPT

## 2018-10-02 PROCEDURE — 90471 IMMUNIZATION ADMIN: CPT

## 2018-10-02 PROCEDURE — 0502F SUBSEQUENT PRENATAL CARE: CPT

## 2018-10-03 ENCOUNTER — NON-APPOINTMENT (OUTPATIENT)
Age: 33
End: 2018-10-03

## 2018-10-03 PROBLEM — Z34.01 ENCOUNTER FOR PRENATAL CARE OF FIRST PREGNANCY, ANTEPARTUM, FIRST TRIMESTER: Status: RESOLVED | Noted: 2018-06-15 | Resolved: 2018-10-03

## 2018-10-04 ENCOUNTER — NON-APPOINTMENT (OUTPATIENT)
Age: 33
End: 2018-10-04

## 2018-10-08 ENCOUNTER — APPOINTMENT (OUTPATIENT)
Dept: ANTEPARTUM | Facility: CLINIC | Age: 33
End: 2018-10-08
Payer: COMMERCIAL

## 2018-10-08 ENCOUNTER — ASOB RESULT (OUTPATIENT)
Age: 33
End: 2018-10-08

## 2018-10-08 PROCEDURE — 76816 OB US FOLLOW-UP PER FETUS: CPT

## 2018-10-30 ENCOUNTER — APPOINTMENT (OUTPATIENT)
Dept: OBGYN | Facility: CLINIC | Age: 33
End: 2018-10-30
Payer: COMMERCIAL

## 2018-10-30 ENCOUNTER — NON-APPOINTMENT (OUTPATIENT)
Age: 33
End: 2018-10-30

## 2018-10-30 VITALS
SYSTOLIC BLOOD PRESSURE: 130 MMHG | BODY MASS INDEX: 23.04 KG/M2 | WEIGHT: 152 LBS | HEIGHT: 68 IN | DIASTOLIC BLOOD PRESSURE: 84 MMHG

## 2018-10-30 PROCEDURE — 36415 COLL VENOUS BLD VENIPUNCTURE: CPT

## 2018-10-30 PROCEDURE — 0502F SUBSEQUENT PRENATAL CARE: CPT

## 2018-10-31 LAB
BASOPHILS # BLD AUTO: 0.03 K/UL
BASOPHILS NFR BLD AUTO: 0.3 %
BILIRUB UR QL STRIP: NORMAL
EOSINOPHIL # BLD AUTO: 0.25 K/UL
EOSINOPHIL NFR BLD AUTO: 2.9 %
GLUCOSE 1H P 100 G GLC PO SERPL-MCNC: 154 MG/DL
GLUCOSE UR-MCNC: NORMAL
HCG UR QL: 0.2 EU/DL
HCT VFR BLD CALC: 34 %
HGB BLD-MCNC: 11.9 G/DL
HGB UR QL STRIP.AUTO: NORMAL
IMM GRANULOCYTES NFR BLD AUTO: 1.3 %
KETONES UR-MCNC: NORMAL
LEUKOCYTE ESTERASE UR QL STRIP: NORMAL
LYMPHOCYTES # BLD AUTO: 2.61 K/UL
LYMPHOCYTES NFR BLD AUTO: 30.2 %
MAN DIFF?: NORMAL
MCHC RBC-ENTMCNC: 32.9 PG
MCHC RBC-ENTMCNC: 35 GM/DL
MCV RBC AUTO: 93.9 FL
MONOCYTES # BLD AUTO: 0.59 K/UL
MONOCYTES NFR BLD AUTO: 6.8 %
NEUTROPHILS # BLD AUTO: 5.05 K/UL
NEUTROPHILS NFR BLD AUTO: 58.5 %
NITRITE UR QL STRIP: NORMAL
PH UR STRIP: 6.5
PLATELET # BLD AUTO: 224 K/UL
PROT UR STRIP-MCNC: NORMAL
RBC # BLD: 3.62 M/UL
RBC # FLD: 13.1 %
SP GR UR STRIP: 1.01
WBC # FLD AUTO: 8.64 K/UL

## 2018-11-04 ENCOUNTER — NON-APPOINTMENT (OUTPATIENT)
Age: 33
End: 2018-11-04

## 2018-11-05 ENCOUNTER — APPOINTMENT (OUTPATIENT)
Dept: ANTEPARTUM | Facility: CLINIC | Age: 33
End: 2018-11-05
Payer: COMMERCIAL

## 2018-11-05 ENCOUNTER — ASOB RESULT (OUTPATIENT)
Age: 33
End: 2018-11-05

## 2018-11-05 PROCEDURE — 76816 OB US FOLLOW-UP PER FETUS: CPT

## 2018-11-12 LAB
GLUCOSE 1H P 100 G GLC PO SERPL-MCNC: 173 MG/DL
GLUCOSE 2H P CHAL SERPL-MCNC: 157 MG/DL
GLUCOSE 3H P CHAL SERPL-MCNC: 109 MG/DL
GLUCOSE BS SERPL-MCNC: 82 MG/DL

## 2018-11-27 ENCOUNTER — APPOINTMENT (OUTPATIENT)
Dept: OBGYN | Facility: CLINIC | Age: 33
End: 2018-11-27
Payer: COMMERCIAL

## 2018-11-27 VITALS
DIASTOLIC BLOOD PRESSURE: 86 MMHG | BODY MASS INDEX: 23.95 KG/M2 | WEIGHT: 158 LBS | SYSTOLIC BLOOD PRESSURE: 140 MMHG | HEIGHT: 68 IN

## 2018-11-27 LAB
BILIRUB UR QL STRIP: NORMAL
GLUCOSE UR-MCNC: NORMAL
HCG UR QL: 0.2 EU/DL
HGB UR QL STRIP.AUTO: NORMAL
KETONES UR-MCNC: 15
LEUKOCYTE ESTERASE UR QL STRIP: NORMAL
NITRITE UR QL STRIP: NORMAL
PH UR STRIP: 6
PROT UR STRIP-MCNC: NORMAL
SP GR UR STRIP: 1.02

## 2018-11-27 PROCEDURE — 90715 TDAP VACCINE 7 YRS/> IM: CPT

## 2018-11-27 PROCEDURE — 90471 IMMUNIZATION ADMIN: CPT

## 2018-11-27 PROCEDURE — 0502F SUBSEQUENT PRENATAL CARE: CPT

## 2018-11-29 ENCOUNTER — NON-APPOINTMENT (OUTPATIENT)
Age: 33
End: 2018-11-29

## 2018-11-29 LAB
ALBUMIN SERPL ELPH-MCNC: 3.5 G/DL
ALP BLD-CCNC: 165 U/L
ALT SERPL-CCNC: 17 U/L
ANION GAP SERPL CALC-SCNC: 11 MMOL/L
APPEARANCE: CLEAR
APTT BLD: 26.7 SEC
AST SERPL-CCNC: 23 U/L
BACTERIA: ABNORMAL
BASOPHILS # BLD AUTO: 0.02 K/UL
BASOPHILS NFR BLD AUTO: 0.2 %
BILIRUB SERPL-MCNC: <0.2 MG/DL
BILIRUBIN URINE: NEGATIVE
BLOOD URINE: NEGATIVE
BUN SERPL-MCNC: 8 MG/DL
CALCIUM SERPL-MCNC: 9 MG/DL
CHLORIDE SERPL-SCNC: 100 MMOL/L
CO2 SERPL-SCNC: 22 MMOL/L
COLOR: YELLOW
CREAT SERPL-MCNC: 0.47 MG/DL
EOSINOPHIL # BLD AUTO: 0.22 K/UL
EOSINOPHIL NFR BLD AUTO: 2.4 %
FIBRINOGEN PPP COAG.DERIVED-MCNC: 547 MG/DL
GLUCOSE QUALITATIVE U: 100 MG/DL
GLUCOSE SERPL-MCNC: 92 MG/DL
HCT VFR BLD CALC: 36.9 %
HGB BLD-MCNC: 12.5 G/DL
HYALINE CASTS: 6 /LPF
IMM GRANULOCYTES NFR BLD AUTO: 1.3 %
INR PPP: 0.9 RATIO
KETONES URINE: ABNORMAL
LDH SERPL-CCNC: 237 U/L
LEUKOCYTE ESTERASE URINE: ABNORMAL
LYMPHOCYTES # BLD AUTO: 2.79 K/UL
LYMPHOCYTES NFR BLD AUTO: 30.1 %
MAN DIFF?: NORMAL
MCHC RBC-ENTMCNC: 32 PG
MCHC RBC-ENTMCNC: 33.9 GM/DL
MCV RBC AUTO: 94.4 FL
MICROSCOPIC-UA: NORMAL
MONOCYTES # BLD AUTO: 0.75 K/UL
MONOCYTES NFR BLD AUTO: 8.1 %
NEUTROPHILS # BLD AUTO: 5.36 K/UL
NEUTROPHILS NFR BLD AUTO: 57.9 %
NITRITE URINE: NEGATIVE
PH URINE: 6
PLATELET # BLD AUTO: 252 K/UL
POTASSIUM SERPL-SCNC: 3.7 MMOL/L
PROT SERPL-MCNC: 6.9 G/DL
PROTEIN URINE: NEGATIVE MG/DL
PT BLD: 10 SEC
RBC # BLD: 3.91 M/UL
RBC # FLD: 13.3 %
RED BLOOD CELLS URINE: 2 /HPF
SODIUM SERPL-SCNC: 133 MMOL/L
SPECIFIC GRAVITY URINE: 1.02
SQUAMOUS EPITHELIAL CELLS: 2 /HPF
URATE SERPL-MCNC: 1.7 MG/DL
UROBILINOGEN URINE: NEGATIVE MG/DL
WBC # FLD AUTO: 9.26 K/UL
WHITE BLOOD CELLS URINE: 33 /HPF

## 2018-12-11 ENCOUNTER — NON-APPOINTMENT (OUTPATIENT)
Age: 33
End: 2018-12-11

## 2018-12-11 ENCOUNTER — APPOINTMENT (OUTPATIENT)
Dept: OBGYN | Facility: CLINIC | Age: 33
End: 2018-12-11
Payer: COMMERCIAL

## 2018-12-11 VITALS
DIASTOLIC BLOOD PRESSURE: 84 MMHG | BODY MASS INDEX: 24.86 KG/M2 | SYSTOLIC BLOOD PRESSURE: 122 MMHG | WEIGHT: 164 LBS | HEIGHT: 68 IN

## 2018-12-11 DIAGNOSIS — Z34.02 ENCOUNTER FOR SUPERVISION OF NORMAL FIRST PREGNANCY, SECOND TRIMESTER: ICD-10-CM

## 2018-12-11 LAB
BILIRUB UR QL STRIP: NORMAL
GLUCOSE UR-MCNC: NORMAL
HCG UR QL: 0.2 EU/DL
HGB UR QL STRIP.AUTO: NORMAL
KETONES UR-MCNC: NORMAL
LEUKOCYTE ESTERASE UR QL STRIP: NORMAL
NITRITE UR QL STRIP: NORMAL
PH UR STRIP: 6
PROT UR STRIP-MCNC: NORMAL
SP GR UR STRIP: 1.01

## 2018-12-11 PROCEDURE — 0502F SUBSEQUENT PRENATAL CARE: CPT

## 2018-12-12 ENCOUNTER — NON-APPOINTMENT (OUTPATIENT)
Age: 33
End: 2018-12-12

## 2018-12-24 ENCOUNTER — APPOINTMENT (OUTPATIENT)
Dept: OBGYN | Facility: CLINIC | Age: 33
End: 2018-12-24
Payer: COMMERCIAL

## 2018-12-24 ENCOUNTER — NON-APPOINTMENT (OUTPATIENT)
Age: 33
End: 2018-12-24

## 2018-12-24 VITALS
SYSTOLIC BLOOD PRESSURE: 112 MMHG | WEIGHT: 165 LBS | HEIGHT: 68 IN | DIASTOLIC BLOOD PRESSURE: 74 MMHG | BODY MASS INDEX: 25.01 KG/M2

## 2018-12-24 PROCEDURE — 0502F SUBSEQUENT PRENATAL CARE: CPT

## 2018-12-26 LAB
BILIRUB UR QL STRIP: NORMAL
GLUCOSE UR-MCNC: NORMAL
HCG UR QL: 0.2 EU/DL
HGB UR QL STRIP.AUTO: NORMAL
KETONES UR-MCNC: NORMAL
LEUKOCYTE ESTERASE UR QL STRIP: NORMAL
NITRITE UR QL STRIP: NORMAL
PH UR STRIP: 7
PROT UR STRIP-MCNC: NORMAL
SP GR UR STRIP: 1.02

## 2018-12-31 ENCOUNTER — LABORATORY RESULT (OUTPATIENT)
Age: 33
End: 2018-12-31

## 2018-12-31 ENCOUNTER — APPOINTMENT (OUTPATIENT)
Dept: OBGYN | Facility: CLINIC | Age: 33
End: 2018-12-31
Payer: COMMERCIAL

## 2018-12-31 VITALS
SYSTOLIC BLOOD PRESSURE: 122 MMHG | HEIGHT: 68 IN | DIASTOLIC BLOOD PRESSURE: 80 MMHG | BODY MASS INDEX: 25.46 KG/M2 | WEIGHT: 168 LBS

## 2018-12-31 PROCEDURE — 36415 COLL VENOUS BLD VENIPUNCTURE: CPT

## 2018-12-31 PROCEDURE — 0502F SUBSEQUENT PRENATAL CARE: CPT

## 2019-01-02 ENCOUNTER — NON-APPOINTMENT (OUTPATIENT)
Age: 34
End: 2019-01-02

## 2019-01-02 LAB
BASOPHILS # BLD AUTO: 0.03 K/UL
BASOPHILS NFR BLD AUTO: 0.3 %
BILIRUB UR QL STRIP: NORMAL
EOSINOPHIL # BLD AUTO: 0.14 K/UL
EOSINOPHIL NFR BLD AUTO: 1.6 %
GLUCOSE UR-MCNC: NORMAL
GP B STREP DNA SPEC QL NAA+PROBE: NORMAL
GP B STREP DNA SPEC QL NAA+PROBE: NOT DETECTED
HCG UR QL: 0.2 EU/DL
HCT VFR BLD CALC: 38.9 %
HGB BLD-MCNC: 12.5 G/DL
HGB UR QL STRIP.AUTO: NORMAL
HIV1+2 AB SPEC QL IA.RAPID: NONREACTIVE
IMM GRANULOCYTES NFR BLD AUTO: 1.8 %
KETONES UR-MCNC: NORMAL
LEUKOCYTE ESTERASE UR QL STRIP: NORMAL
LYMPHOCYTES # BLD AUTO: 2.56 K/UL
LYMPHOCYTES NFR BLD AUTO: 28.8 %
MAN DIFF?: NORMAL
MCHC RBC-ENTMCNC: 30.8 PG
MCHC RBC-ENTMCNC: 32.1 GM/DL
MCV RBC AUTO: 95.8 FL
MONOCYTES # BLD AUTO: 0.79 K/UL
MONOCYTES NFR BLD AUTO: 8.9 %
NEUTROPHILS # BLD AUTO: 5.21 K/UL
NEUTROPHILS NFR BLD AUTO: 58.6 %
NITRITE UR QL STRIP: NORMAL
PH UR STRIP: 7
PLATELET # BLD AUTO: 245 K/UL
PROT UR STRIP-MCNC: NORMAL
RBC # BLD: 4.06 M/UL
RBC # FLD: 13.4 %
SOURCE GBS: NORMAL
SP GR UR STRIP: 1.01
WBC # FLD AUTO: 8.89 K/UL

## 2019-01-08 ENCOUNTER — NON-APPOINTMENT (OUTPATIENT)
Age: 34
End: 2019-01-08

## 2019-01-08 ENCOUNTER — APPOINTMENT (OUTPATIENT)
Dept: OBGYN | Facility: CLINIC | Age: 34
End: 2019-01-08
Payer: COMMERCIAL

## 2019-01-08 VITALS
DIASTOLIC BLOOD PRESSURE: 70 MMHG | WEIGHT: 169 LBS | BODY MASS INDEX: 25.61 KG/M2 | HEIGHT: 68 IN | SYSTOLIC BLOOD PRESSURE: 110 MMHG

## 2019-01-08 PROCEDURE — 0502F SUBSEQUENT PRENATAL CARE: CPT

## 2019-01-15 ENCOUNTER — NON-APPOINTMENT (OUTPATIENT)
Age: 34
End: 2019-01-15

## 2019-01-15 ENCOUNTER — APPOINTMENT (OUTPATIENT)
Dept: OBGYN | Facility: CLINIC | Age: 34
End: 2019-01-15
Payer: COMMERCIAL

## 2019-01-15 VITALS
SYSTOLIC BLOOD PRESSURE: 120 MMHG | DIASTOLIC BLOOD PRESSURE: 82 MMHG | WEIGHT: 172 LBS | HEIGHT: 68 IN | BODY MASS INDEX: 26.07 KG/M2

## 2019-01-15 LAB
BILIRUB UR QL STRIP: NORMAL
GLUCOSE UR-MCNC: NORMAL
HCG UR QL: 0.2 EU/DL
HGB UR QL STRIP.AUTO: NORMAL
KETONES UR-MCNC: NORMAL
LEUKOCYTE ESTERASE UR QL STRIP: NORMAL
NITRITE UR QL STRIP: NORMAL
PH UR STRIP: 6.5
PROT UR STRIP-MCNC: NORMAL
SP GR UR STRIP: 1.01

## 2019-01-15 PROCEDURE — 0502F SUBSEQUENT PRENATAL CARE: CPT

## 2019-01-21 ENCOUNTER — TRANSCRIPTION ENCOUNTER (OUTPATIENT)
Age: 34
End: 2019-01-21

## 2019-01-21 ENCOUNTER — INPATIENT (INPATIENT)
Facility: HOSPITAL | Age: 34
LOS: 1 days | Discharge: HOME | End: 2019-01-23
Attending: OBSTETRICS & GYNECOLOGY | Admitting: OBSTETRICS & GYNECOLOGY
Payer: COMMERCIAL

## 2019-01-21 VITALS
TEMPERATURE: 98 F | DIASTOLIC BLOOD PRESSURE: 76 MMHG | RESPIRATION RATE: 18 BRPM | SYSTOLIC BLOOD PRESSURE: 122 MMHG | HEART RATE: 116 BPM | OXYGEN SATURATION: 100 %

## 2019-01-21 DIAGNOSIS — Z3A.00 WEEKS OF GESTATION OF PREGNANCY NOT SPECIFIED: ICD-10-CM

## 2019-01-21 DIAGNOSIS — O26.899 OTHER SPECIFIED PREGNANCY RELATED CONDITIONS, UNSPECIFIED TRIMESTER: ICD-10-CM

## 2019-01-21 LAB
BASOPHILS # BLD AUTO: 0.06 K/UL — SIGNIFICANT CHANGE UP (ref 0–0.2)
BASOPHILS NFR BLD AUTO: 0.6 % — SIGNIFICANT CHANGE UP (ref 0–2)
BLD GP AB SCN SERPL QL: NEGATIVE — SIGNIFICANT CHANGE UP
EOSINOPHIL # BLD AUTO: 0.07 K/UL — SIGNIFICANT CHANGE UP (ref 0–0.5)
EOSINOPHIL NFR BLD AUTO: 0.7 % — SIGNIFICANT CHANGE UP (ref 0–6)
HCT VFR BLD CALC: 39.2 % — SIGNIFICANT CHANGE UP (ref 34.5–45)
HGB BLD-MCNC: 12.9 G/DL — SIGNIFICANT CHANGE UP (ref 11.5–15.5)
IMM GRANULOCYTES NFR BLD AUTO: 1.4 % — SIGNIFICANT CHANGE UP (ref 0–1.5)
LYMPHOCYTES # BLD AUTO: 1.37 K/UL — SIGNIFICANT CHANGE UP (ref 1–3.3)
LYMPHOCYTES # BLD AUTO: 13.7 % — SIGNIFICANT CHANGE UP (ref 13–44)
MCHC RBC-ENTMCNC: 30.4 PG — SIGNIFICANT CHANGE UP (ref 27–34)
MCHC RBC-ENTMCNC: 32.9 % — SIGNIFICANT CHANGE UP (ref 32–36)
MCV RBC AUTO: 92.5 FL — SIGNIFICANT CHANGE UP (ref 80–100)
MONOCYTES # BLD AUTO: 0.52 K/UL — SIGNIFICANT CHANGE UP (ref 0–0.9)
MONOCYTES NFR BLD AUTO: 5.2 % — SIGNIFICANT CHANGE UP (ref 2–14)
NEUTROPHILS # BLD AUTO: 7.85 K/UL — HIGH (ref 1.8–7.4)
NEUTROPHILS NFR BLD AUTO: 78.4 % — HIGH (ref 43–77)
NRBC # FLD: 0 K/UL — LOW (ref 25–125)
PLATELET # BLD AUTO: 254 K/UL — SIGNIFICANT CHANGE UP (ref 150–400)
PMV BLD: 9 FL — SIGNIFICANT CHANGE UP (ref 7–13)
RBC # BLD: 4.24 M/UL — SIGNIFICANT CHANGE UP (ref 3.8–5.2)
RBC # FLD: 12.6 % — SIGNIFICANT CHANGE UP (ref 10.3–14.5)
RH IG SCN BLD-IMP: POSITIVE — SIGNIFICANT CHANGE UP
RH IG SCN BLD-IMP: POSITIVE — SIGNIFICANT CHANGE UP
T PALLIDUM AB TITR SER: NEGATIVE — SIGNIFICANT CHANGE UP
WBC # BLD: 10.01 K/UL — SIGNIFICANT CHANGE UP (ref 3.8–10.5)
WBC # FLD AUTO: 10.01 K/UL — SIGNIFICANT CHANGE UP (ref 3.8–10.5)

## 2019-01-21 PROCEDURE — 59400 OBSTETRICAL CARE: CPT | Mod: U9

## 2019-01-21 RX ORDER — OXYTOCIN 10 UNIT/ML
41.67 VIAL (ML) INJECTION
Qty: 20 | Refills: 0 | Status: DISCONTINUED | OUTPATIENT
Start: 2019-01-21 | End: 2019-01-21

## 2019-01-21 RX ORDER — IBUPROFEN 200 MG
600 TABLET ORAL EVERY 6 HOURS
Qty: 0 | Refills: 0 | Status: COMPLETED | OUTPATIENT
Start: 2019-01-21 | End: 2019-12-20

## 2019-01-21 RX ORDER — SODIUM CHLORIDE 9 MG/ML
3 INJECTION INTRAMUSCULAR; INTRAVENOUS; SUBCUTANEOUS EVERY 8 HOURS
Qty: 0 | Refills: 0 | Status: DISCONTINUED | OUTPATIENT
Start: 2019-01-22 | End: 2019-01-23

## 2019-01-21 RX ORDER — OXYCODONE HYDROCHLORIDE 5 MG/1
5 TABLET ORAL
Qty: 0 | Refills: 0 | Status: DISCONTINUED | OUTPATIENT
Start: 2019-01-21 | End: 2019-01-23

## 2019-01-21 RX ORDER — AER TRAVELER 0.5 G/1
1 SOLUTION RECTAL; TOPICAL EVERY 4 HOURS
Qty: 0 | Refills: 0 | Status: DISCONTINUED | OUTPATIENT
Start: 2019-01-22 | End: 2019-01-23

## 2019-01-21 RX ORDER — ACETAMINOPHEN 500 MG
975 TABLET ORAL EVERY 6 HOURS
Qty: 0 | Refills: 0 | Status: COMPLETED | OUTPATIENT
Start: 2019-01-21 | End: 2019-12-20

## 2019-01-21 RX ORDER — OXYCODONE HYDROCHLORIDE 5 MG/1
5 TABLET ORAL EVERY 4 HOURS
Qty: 0 | Refills: 0 | Status: DISCONTINUED | OUTPATIENT
Start: 2019-01-21 | End: 2019-01-23

## 2019-01-21 RX ORDER — SODIUM CHLORIDE 9 MG/ML
3 INJECTION INTRAMUSCULAR; INTRAVENOUS; SUBCUTANEOUS EVERY 8 HOURS
Qty: 0 | Refills: 0 | Status: DISCONTINUED | OUTPATIENT
Start: 2019-01-21 | End: 2019-01-21

## 2019-01-21 RX ORDER — SIMETHICONE 80 MG/1
80 TABLET, CHEWABLE ORAL EVERY 6 HOURS
Qty: 0 | Refills: 0 | Status: DISCONTINUED | OUTPATIENT
Start: 2019-01-22 | End: 2019-01-23

## 2019-01-21 RX ORDER — OXYTOCIN 10 UNIT/ML
333.33 VIAL (ML) INJECTION
Qty: 20 | Refills: 0 | Status: COMPLETED | OUTPATIENT
Start: 2019-01-21

## 2019-01-21 RX ORDER — PRAMOXINE HYDROCHLORIDE 150 MG/15G
1 AEROSOL, FOAM RECTAL EVERY 4 HOURS
Qty: 0 | Refills: 0 | Status: DISCONTINUED | OUTPATIENT
Start: 2019-01-21 | End: 2019-01-21

## 2019-01-21 RX ORDER — DIBUCAINE 1 %
1 OINTMENT (GRAM) RECTAL EVERY 4 HOURS
Qty: 0 | Refills: 0 | Status: DISCONTINUED | OUTPATIENT
Start: 2019-01-22 | End: 2019-01-23

## 2019-01-21 RX ORDER — GLYCERIN ADULT
1 SUPPOSITORY, RECTAL RECTAL AT BEDTIME
Qty: 0 | Refills: 0 | Status: DISCONTINUED | OUTPATIENT
Start: 2019-01-22 | End: 2019-01-23

## 2019-01-21 RX ORDER — OXYTOCIN 10 UNIT/ML
41.67 VIAL (ML) INJECTION
Qty: 20 | Refills: 0 | Status: DISCONTINUED | OUTPATIENT
Start: 2019-01-22 | End: 2019-01-22

## 2019-01-21 RX ORDER — DIPHENHYDRAMINE HCL 50 MG
25 CAPSULE ORAL EVERY 6 HOURS
Qty: 0 | Refills: 0 | Status: DISCONTINUED | OUTPATIENT
Start: 2019-01-22 | End: 2019-01-23

## 2019-01-21 RX ORDER — DOCUSATE SODIUM 100 MG
100 CAPSULE ORAL
Qty: 0 | Refills: 0 | Status: DISCONTINUED | OUTPATIENT
Start: 2019-01-22 | End: 2019-01-23

## 2019-01-21 RX ORDER — TETANUS TOXOID, REDUCED DIPHTHERIA TOXOID AND ACELLULAR PERTUSSIS VACCINE, ADSORBED 5; 2.5; 8; 8; 2.5 [IU]/.5ML; [IU]/.5ML; UG/.5ML; UG/.5ML; UG/.5ML
0.5 SUSPENSION INTRAMUSCULAR ONCE
Qty: 0 | Refills: 0 | Status: DISCONTINUED | OUTPATIENT
Start: 2019-01-22 | End: 2019-01-23

## 2019-01-21 RX ORDER — AER TRAVELER 0.5 G/1
1 SOLUTION RECTAL; TOPICAL EVERY 4 HOURS
Qty: 0 | Refills: 0 | Status: DISCONTINUED | OUTPATIENT
Start: 2019-01-21 | End: 2019-01-21

## 2019-01-21 RX ORDER — DIBUCAINE 1 %
1 OINTMENT (GRAM) RECTAL EVERY 4 HOURS
Qty: 0 | Refills: 0 | Status: DISCONTINUED | OUTPATIENT
Start: 2019-01-21 | End: 2019-01-21

## 2019-01-21 RX ORDER — HYDROCORTISONE 1 %
1 OINTMENT (GRAM) TOPICAL EVERY 4 HOURS
Qty: 0 | Refills: 0 | Status: DISCONTINUED | OUTPATIENT
Start: 2019-01-21 | End: 2019-01-21

## 2019-01-21 RX ORDER — LANOLIN
1 OINTMENT (GRAM) TOPICAL EVERY 6 HOURS
Qty: 0 | Refills: 0 | Status: DISCONTINUED | OUTPATIENT
Start: 2019-01-22 | End: 2019-01-23

## 2019-01-21 RX ORDER — MAGNESIUM HYDROXIDE 400 MG/1
30 TABLET, CHEWABLE ORAL
Qty: 0 | Refills: 0 | Status: DISCONTINUED | OUTPATIENT
Start: 2019-01-22 | End: 2019-01-23

## 2019-01-21 RX ORDER — KETOROLAC TROMETHAMINE 30 MG/ML
30 SYRINGE (ML) INJECTION ONCE
Qty: 0 | Refills: 0 | Status: DISCONTINUED | OUTPATIENT
Start: 2019-01-21 | End: 2019-01-21

## 2019-01-21 RX ORDER — SODIUM CHLORIDE 9 MG/ML
1000 INJECTION, SOLUTION INTRAVENOUS
Qty: 0 | Refills: 0 | Status: DISCONTINUED | OUTPATIENT
Start: 2019-01-21 | End: 2019-01-21

## 2019-01-21 RX ORDER — OXYTOCIN 10 UNIT/ML
333.33 VIAL (ML) INJECTION
Qty: 20 | Refills: 0 | Status: COMPLETED | OUTPATIENT
Start: 2019-01-21 | End: 2019-01-21

## 2019-01-21 RX ADMIN — Medication 1000 MILLIUNIT(S)/MIN: at 21:49

## 2019-01-21 RX ADMIN — Medication 125 MILLIUNIT(S)/MIN: at 21:49

## 2019-01-21 RX ADMIN — SODIUM CHLORIDE 250 MILLILITER(S): 9 INJECTION, SOLUTION INTRAVENOUS at 11:22

## 2019-01-21 RX ADMIN — Medication 30 MILLIGRAM(S): at 21:23

## 2019-01-21 RX ADMIN — Medication 30 MILLIGRAM(S): at 21:48

## 2019-01-21 NOTE — DISCHARGE NOTE OB - PATIENT PORTAL LINK FT
You can access the [facility] Patient Portal, offered by [facility], by registering with the following website: [email or web address]

## 2019-01-21 NOTE — DISCHARGE NOTE OB - FUNCTIONAL SCREEN CURRENT LEVEL: AMBULATION, MLM
Pre-Visit Chart Review  For Appointment Scheduled on 3-23-18    Health Maintenance Due   Topic Date Due    TETANUS VACCINE  07/21/1946    Eye Exam  10/30/2016    Foot Exam  11/29/2017    Hemoglobin A1c  03/11/2018      
0 = independent

## 2019-01-21 NOTE — DISCHARGE NOTE OB - CARE PROVIDER_API CALL
Valdez Grant), Obstetrics and Gynecology  [address]  [address] [place] [address]  Phone: [phone]  Fax: [phone]

## 2019-01-22 ENCOUNTER — APPOINTMENT (OUTPATIENT)
Dept: OBGYN | Facility: CLINIC | Age: 34
End: 2019-01-22

## 2019-01-22 RX ORDER — IBUPROFEN 200 MG
600 TABLET ORAL EVERY 6 HOURS
Qty: 0 | Refills: 0 | Status: DISCONTINUED | OUTPATIENT
Start: 2019-01-22 | End: 2019-01-23

## 2019-01-22 RX ORDER — HYDROCORTISONE 1 %
1 OINTMENT (GRAM) TOPICAL EVERY 4 HOURS
Qty: 0 | Refills: 0 | Status: DISCONTINUED | OUTPATIENT
Start: 2019-01-22 | End: 2019-01-23

## 2019-01-22 RX ORDER — ACETAMINOPHEN 500 MG
975 TABLET ORAL EVERY 6 HOURS
Qty: 0 | Refills: 0 | Status: DISCONTINUED | OUTPATIENT
Start: 2019-01-22 | End: 2019-01-23

## 2019-01-22 RX ORDER — IBUPROFEN 200 MG
1 TABLET ORAL
Qty: 0 | Refills: 0 | DISCHARGE
Start: 2019-01-22

## 2019-01-22 RX ORDER — PRAMOXINE HYDROCHLORIDE 150 MG/15G
1 AEROSOL, FOAM RECTAL EVERY 4 HOURS
Qty: 0 | Refills: 0 | Status: DISCONTINUED | OUTPATIENT
Start: 2019-01-22 | End: 2019-01-23

## 2019-01-22 RX ORDER — PRAMOXINE HYDROCHLORIDE 150 MG/15G
1 AEROSOL, FOAM RECTAL EVERY 4 HOURS
Qty: 0 | Refills: 0 | Status: DISCONTINUED | OUTPATIENT
Start: 2019-01-22 | End: 2019-01-22

## 2019-01-22 RX ORDER — HYDROCORTISONE 1 %
1 OINTMENT (GRAM) TOPICAL EVERY 4 HOURS
Qty: 0 | Refills: 0 | Status: DISCONTINUED | OUTPATIENT
Start: 2019-01-22 | End: 2019-01-22

## 2019-01-22 RX ADMIN — SODIUM CHLORIDE 3 MILLILITER(S): 9 INJECTION INTRAMUSCULAR; INTRAVENOUS; SUBCUTANEOUS at 01:00

## 2019-01-22 RX ADMIN — Medication 975 MILLIGRAM(S): at 12:45

## 2019-01-22 RX ADMIN — Medication 600 MILLIGRAM(S): at 04:31

## 2019-01-22 RX ADMIN — Medication 975 MILLIGRAM(S): at 03:46

## 2019-01-22 RX ADMIN — Medication 975 MILLIGRAM(S): at 04:31

## 2019-01-22 RX ADMIN — Medication 975 MILLIGRAM(S): at 18:00

## 2019-01-22 RX ADMIN — Medication 975 MILLIGRAM(S): at 17:24

## 2019-01-22 RX ADMIN — Medication 600 MILLIGRAM(S): at 12:05

## 2019-01-22 RX ADMIN — Medication 975 MILLIGRAM(S): at 12:04

## 2019-01-22 RX ADMIN — Medication 600 MILLIGRAM(S): at 17:25

## 2019-01-22 RX ADMIN — Medication 600 MILLIGRAM(S): at 03:45

## 2019-01-22 RX ADMIN — Medication 600 MILLIGRAM(S): at 12:45

## 2019-01-22 RX ADMIN — Medication 600 MILLIGRAM(S): at 18:00

## 2019-01-22 NOTE — PROGRESS NOTE ADULT - SUBJECTIVE AND OBJECTIVE BOX
Day  1  Vaginal Delivery    She feels well. Pain is well controlled. Minimal vaginal bleeding.  T(C): 36.7 (19 @ 05:41), Max: 36.8 (19 @ 20:51)  HR: 96 (19 @ 05:41) (95 - 116)  BP: 124/63 (19 @ 05:41) (106/52 - 131/75)  RR: 17 (19 @ 05:41) (17 - 18)  SpO2: 99% (19 @ 05:41) (98% - 100%)  Wt(kg): --  Vital signs stable    Exam   Abdomen - soft, nondistended, appropriately tender, fundus firm  Ext - no calf tenderness        Assessment and Plan  Postpartum day #1 s/p  - stable  Continue current pain medication  Encourage  Ambulation  Encourage regular diet  DVT ppx: SCDs only when not ambulating  She will be discharged on Day 2 according to the normal criteria.

## 2019-01-22 NOTE — PROGRESS NOTE ADULT - SUBJECTIVE AND OBJECTIVE BOX
Anesthesia Post-op Note    POD#1 S/P labor epidural    Patient is doing well.  OOBAA. Tolerating clears.  Pain is tolerable.  No residual anesthetic issues or complications noted.    Vital Signs Last 24 Hrs  T(C): 36.7 (22 Jan 2019 05:41), Max: 36.8 (21 Jan 2019 20:51)  T(F): 98.1 (22 Jan 2019 05:41), Max: 98.3 (21 Jan 2019 23:39)  HR: 96 (22 Jan 2019 05:41) (95 - 116)  BP: 124/63 (22 Jan 2019 05:41) (106/52 - 131/75)  BP(mean): 96 (21 Jan 2019 21:36) (93 - 96)  RR: 17 (22 Jan 2019 05:41) (17 - 18)  SpO2: 99% (22 Jan 2019 05:41) (98% - 100%)

## 2019-01-23 VITALS
HEART RATE: 95 BPM | DIASTOLIC BLOOD PRESSURE: 78 MMHG | SYSTOLIC BLOOD PRESSURE: 125 MMHG | RESPIRATION RATE: 17 BRPM | TEMPERATURE: 98 F | OXYGEN SATURATION: 98 %

## 2019-01-23 RX ADMIN — Medication 975 MILLIGRAM(S): at 06:30

## 2019-01-23 RX ADMIN — Medication 975 MILLIGRAM(S): at 05:29

## 2019-01-23 RX ADMIN — Medication 975 MILLIGRAM(S): at 01:20

## 2019-01-23 RX ADMIN — Medication 600 MILLIGRAM(S): at 05:27

## 2019-01-23 RX ADMIN — Medication 600 MILLIGRAM(S): at 06:30

## 2019-01-23 RX ADMIN — Medication 600 MILLIGRAM(S): at 01:20

## 2019-01-23 RX ADMIN — Medication 600 MILLIGRAM(S): at 00:15

## 2019-01-23 RX ADMIN — Medication 975 MILLIGRAM(S): at 00:17

## 2019-01-24 NOTE — ED CDU PROVIDER INITIAL DAY NOTE - PHYSICAL EXAMINATION
<<-----Click here for Discharge Medication Review
Pt in NAD, A&O x3. NIHSS 0. No speech difficulty, normal affect. CN 2-12 grossly intact. Head NCAT, sclera white without injection, PERRLA, EOMI. Nose without deformity, turbinates without edema or erythema, no DC. No auricular tenderness. Mouth and pharynx without erythema or exudates, uvula midline, no tonsillar enlargement. Neck supple FROM, trachea midline, no lymphadenopathy. No retractions or chest wall deformities. No chest wall tenderness, CTA anterior and posterior b/l, no wheezes, rales, or rhonchi. RRR, clear distinct S1, S2, no S3, S4, murmurs, gallops, or rubs. Abdomen gravid with firm non-tender uterus, remainder of abdo soft, non-tender, no rebound or guarding, organomegaly or masses. No CVAT b/l. 2+ carotid, radial, and dorsalis pedis pulses b/l. No edema or tenderness of the lower extremities. Normal and equal sensation UE/LE/face b/l. 5/5 strength UE/LE b/l. Normal gait and gross cerebellar function. No rashes.

## 2019-03-01 ENCOUNTER — APPOINTMENT (OUTPATIENT)
Dept: OBGYN | Facility: CLINIC | Age: 34
End: 2019-03-01
Payer: COMMERCIAL

## 2019-03-01 ENCOUNTER — APPOINTMENT (OUTPATIENT)
Dept: OBGYN | Facility: CLINIC | Age: 34
End: 2019-03-01

## 2019-03-01 VITALS
WEIGHT: 152 LBS | SYSTOLIC BLOOD PRESSURE: 104 MMHG | HEIGHT: 68 IN | BODY MASS INDEX: 23.04 KG/M2 | DIASTOLIC BLOOD PRESSURE: 62 MMHG

## 2019-03-01 DIAGNOSIS — O36.80X0 PREGNANCY WITH INCONCLUSIVE FETAL VIABILITY, NOT APPLICABLE OR UNSPECIFIED: ICD-10-CM

## 2019-03-01 DIAGNOSIS — Z87.898 PERSONAL HISTORY OF OTHER SPECIFIED CONDITIONS: ICD-10-CM

## 2019-03-01 DIAGNOSIS — O28.3 ABNORMAL ULTRASONIC FINDING ON ANTENATAL SCREENING OF MOTHER: ICD-10-CM

## 2019-03-01 DIAGNOSIS — R73.09 OTHER ABNORMAL GLUCOSE: ICD-10-CM

## 2019-03-01 DIAGNOSIS — Z34.93 ENCOUNTER FOR SUPERVISION OF NORMAL PREGNANCY, UNSPECIFIED, THIRD TRIMESTER: ICD-10-CM

## 2019-03-01 DIAGNOSIS — S92.351D DISPLACED FRACTURE OF FIFTH METATARSAL BONE, RIGHT FOOT, SUBSEQUENT ENCOUNTER FOR FRACTURE WITH ROUTINE HEALING: ICD-10-CM

## 2019-03-01 DIAGNOSIS — O35.0XX0 MATERNAL CARE FOR (SUSPECTED) CENTRAL NERVOUS SYSTEM MALFORMATION IN FETUS, NOT APPLICABLE OR UNSPECIFIED: ICD-10-CM

## 2019-03-01 DIAGNOSIS — Z36.89 ENCOUNTER FOR OTHER SPECIFIED ANTENATAL SCREENING: ICD-10-CM

## 2019-03-01 DIAGNOSIS — Z23 ENCOUNTER FOR IMMUNIZATION: ICD-10-CM

## 2019-03-01 PROCEDURE — 0503F POSTPARTUM CARE VISIT: CPT

## 2019-03-03 PROBLEM — O35.0XX0 VENTRICULOMEGALY OF BRAIN ON FETAL ULTRASOUND: Status: RESOLVED | Noted: 2018-09-14 | Resolved: 2019-03-03

## 2019-03-03 PROBLEM — O28.3 ECHOGENIC INTRACARDIAC FOCUS OF FETUS ON PRENATAL ULTRASOUND: Status: RESOLVED | Noted: 2018-09-14 | Resolved: 2019-03-03

## 2019-03-03 PROBLEM — Z87.898 HISTORY OF HEADACHE: Status: RESOLVED | Noted: 2018-08-14 | Resolved: 2019-03-03

## 2019-03-03 PROBLEM — Z23 NEED FOR TDAP VACCINATION: Status: RESOLVED | Noted: 2018-11-29 | Resolved: 2019-03-03

## 2019-03-03 PROBLEM — O36.80X0 ENCOUNTER TO DETERMINE FETAL VIABILITY OF PREGNANCY: Status: RESOLVED | Noted: 2018-07-08 | Resolved: 2019-03-03

## 2019-03-03 PROBLEM — Z34.93 ENCOUNTER FOR PREGNANCY RELATED EXAMINATION IN THIRD TRIMESTER: Status: RESOLVED | Noted: 2018-12-11 | Resolved: 2019-03-03

## 2019-03-03 PROBLEM — R73.09 ELEVATED GLUCOSE LEVEL: Status: RESOLVED | Noted: 2018-10-31 | Resolved: 2019-03-03

## 2019-03-03 PROBLEM — Z36.89 ESTABLISH GESTATIONAL AGE, ULTRASOUND: Status: RESOLVED | Noted: 2018-06-15 | Resolved: 2019-03-03

## 2019-03-03 NOTE — HISTORY OF PRESENT ILLNESS
[Postpartum Follow Up] : postpartum follow up [Complications:___] : no complications [Delivery Date: ___] : on [unfilled] [] : delivered by vaginal delivery [Male] : Delivery History: baby boy [Wt. ___] : weighing [unfilled] [Breastfeeding] : not currently nursing [S/Sx PP Depression] : no signs/symptoms of postpartum depression [Back to Normal] : is back to normal in size [Mild] : mild vaginal bleeding [Normal] : the vagina was normal [Cervix Sample Taken] : cervical sample not taken for a Pap smear [Not Done] : Examination of breasts not done [Doing Well] : is doing well [No Sign of Infection] : is showing no signs of infection [Excellent Pain Control] : has excellent pain control [None] : None [de-identified] : 39 weeks

## 2019-04-10 NOTE — ED ADULT NURSE REASSESSMENT NOTE - NEURO ASSESSMENT
- - -
- - -
      Effusion: mod suprapatellar region and joint line 3/27    Reflexes/Sensation:               [x]Dermatomes/Myotomes intact 3/27              []Reflexes equal and normal bilaterally              []Other:     Joint mobility: 3/27               [x]Normal               []Hypo              []Hyper     Palpation: no significant tenderness 3/27     Functional Mobility/Transfers: [facility] 3/27     Posture: WFL for 15 yr old female 3/27     Bandages/Dressings/Incisions: healing well 3/27     Gait: (include devices/WB status) normal gait 3/27                                RESTRICTIONS/PRECAUTIONS: ACL reconstruction w/ patellar tendon graft    Exercises/Interventions:   Exercise/Equipment Resistance/Repetitions Other comments   Stretching     Hamstring [id number]    Towel Pull     Inclined Calf [id number] Added 2/11   Hip Flexion     ITB     Groin     Quad standing 5x30\"  Added 2/27                  SLR     Supine 3x10 6# ^ 4/3   Abduction 3x10 6#  ^ 4/3   Adduction 3x10 5# ^ 3/27   Prone 3x10 5# ^ 3/27   SLR+ 5x30\"  Added 2/18   Hip circuit - 3x through 15x up/bk,side/side,circles CW/CCW, knee to elbow  Added 3/27                  Patellar Glides     Medial     Superior     Inferior          ROM     Sheet pulls  [id number]    Passive     ERMI    5x30\"  Added 2/15   Ankle Pumps      Bike 7'  ^ 4/10             CKC     Calf raises 3x10 SL ^ 3/   Nadean Fluke sits 5x45\" blk band  ^ 4/10   Step ups L3 3x10   Lateral L1 3x10  ^ 4/10   1 leg stand 3x30\" on airex gerald gabriela's ^3/18   Lateral band walking  5x blk band  ^ 4/10   Triple threat 3x10 ball  ^ 4/10   Stool scoots  3 laps Added 3/18   SL DL  3x10 + 20#  ^ 4/3        PRE     Extension     Flexion          Quantum machines     Leg press  3x10 #   3x10 SL 70#  ^ 4/10   Leg extension ECC 3x10 30#  ^ 4/10   Leg curl 3x10 35# SL ^ 4/10                   Therapeutic Exercise and NMR EXR  [x] (19138) Provided verbal/tactile cueing for activities related to strengthening, flexibility,
endurance, ROM for improvements in LE, proximal hip, and core control with self care, mobility, lifting, ambulation. [x] (18841) Provided verbal/tactile cueing for activities related to improving balance, coordination, kinesthetic sense, posture, motor skill, proprioception  to assist with LE, proximal hip, and core control in self care, mobility, lifting, ambulation and eccentric single leg control. NMR and Therapeutic Activities:    [x] (93814 or 01392) Provided verbal/tactile cueing for activities related to improving balance, coordination, kinesthetic sense, posture, motor skill, proprioception and motor activation to allow for proper function of core, proximal hip and LE with self care and ADLs  [] (97680) Gait Re-education- Provided training and instruction to the patient for proper LE, core and proximal hip recruitment and positioning and eccentric body weight control with ambulation re-education including up and down stairs     Home Exercise Program:    [x] (31584) Reviewed/Progressed HEP activities related to strengthening, flexibility, endurance, ROM of core, proximal hip and LE for functional self-care, mobility, lifting and ambulation/stair navigation   []  (01026)Reviewed/Progressed HEP activities related to improving balance, coordination, kinesthetic sense, posture, motor skill, proprioception of core, proximal hip and LE for self care, mobility, lifting, and ambulation/stair navigation      Manual Treatments:  PROM / STM / Oscillations-Mobs:  G-I, II, III, IV (PA's, Inf., Post.)  [] (53126) Provided manual therapy to mobilize LE, proximal hip and/or LS spine soft tissue/joints for the purpose of modulating pain, promoting relaxation,  increasing ROM, reducing/eliminating soft tissue swelling/inflammation/restriction, improving soft tissue extensibility and allowing for proper ROM for normal function with self care, mobility, lifting and ambulation.      Modalities: ice to go
4/10    Charges:  Timed Code Treatment Minutes: 40   Total Treatment Minutes: [phone]       [] EVAL (LOW) 60493 (typically 20 minutes face-to-face)  [] EVAL (MOD) 36000 (typically 30 minutes face-to-face)  [] EVAL (HIGH) 52539 (typically 45 minutes face-to-face)  [] RE-EVAL   [x] BL(91478) x  2   [] IONTO  [] NMR (63838) x      [] VASO  [] Manual (74222) x       [] Other:  Performance (arthrometer)   [x] TA x  1    [] Mech Traction (79105)  [] ES(attended) (12639)      [] ES (un) (56862):     GOALS:   Patient stated goal: prepare for surgery and return to sport post surgery     Therapist goals for Patient:   Short Term Goals: To be achieved in: 2 weeks  1. Independent in HEP and progression per patient tolerance, in order to prevent re-injury. met  2. Patient will have a decrease in pain to facilitate improvement in movement, function, and ADLs as indicated by Functional Deficits. met     Long Term Goals: To be achieved in:   1. Disability index score of 10% or less for the LEFS to assist with reaching prior level of function. 6 mo in progress  2. Patient will demonstrate increased AROM to WNL to allow for proper joint functioning as indicated by patients Functional Deficits. 4 weeks MET  3. Patient will demonstrate an increase in Strength to good proximal hip strength and control in LE (MMT at least 4+/5) to allow for proper functional mobility as indicated by patients Functional Deficits. 4-6 months   4. Patient will return to [address] activities without increased symptoms or restriction. Basic adls 8 weeks, high level adls 16 weeks sport 6-29 mo                  Progression Towards Functional goals:  [x] Patient is progressing as expected towards functional goals listed. [] Progression is slowed due to complexities listed. [] Progression has been slowed due to co-morbidities.   [] Plan just implemented, too soon to assess goals progression  [] Other:     ASSESSMENT:      Treatment/Activity Tolerance:  [x]
Patient tolerated treatment well 4/10 [] Patient limited by fatique  [] Patient limited by pain  [] Patient limited by other medical complications  [] Other:       Patient education: 1/17 Reviewed diagnosis, POC, HEP, RICE and its importance. 1/21 discussed that she can put weight through leg while using crutches   2/1 Patient education on PT and plan of care including diagnosis, prognosis, treatment goals and options. Patient agrees with discussed POC and treatment and is aware of rehab process. Pt was also educated on clinic layout and use of modalities. 2/13 come out of immobilizer in school to try to sit normal in class to work on bending; stressed importance of working on ROM  3/18 reviewed program to do while in [facility]        Prognosis: [x] Good [] Fair  [] Poor    Patient Requires Follow-up: [x] Yes  [] No    PLAN: 1-2x per week for 4 weeks. 3/27  [x] Continue per plan of care [] Alter current plan (see above)  [] Plan of care initiated [] Hold pending MD visit [] Discharge    Electronically signed by: Elio Solorio DPT     *If patient does not return for further follow ups after this date. Please consider this as the patients discharge from physical therapy.
- - -

## 2019-05-07 ENCOUNTER — APPOINTMENT (OUTPATIENT)
Dept: OBGYN | Facility: CLINIC | Age: 34
End: 2019-05-07
Payer: COMMERCIAL

## 2019-05-07 VITALS
BODY MASS INDEX: 22.88 KG/M2 | WEIGHT: 151 LBS | DIASTOLIC BLOOD PRESSURE: 80 MMHG | HEIGHT: 68 IN | SYSTOLIC BLOOD PRESSURE: 140 MMHG

## 2019-05-07 PROCEDURE — 99395 PREV VISIT EST AGE 18-39: CPT

## 2019-05-07 NOTE — PHYSICAL EXAM
[Awake] : awake [Alert] : alert [Acute Distress] : no acute distress [Nipple Discharge] : no nipple discharge [Mass] : no breast mass [Axillary LAD] : no axillary lymphadenopathy [Soft] : soft [Tender] : non tender [Oriented x3] : oriented to person, place, and time [No Bleeding] : there was no active vaginal bleeding [Normal] : cervix [Uterine Adnexae] : were not tender and not enlarged

## 2019-05-07 NOTE — COUNSELING
[Breast Self Exam] : breast self exam [Nutrition] : nutrition [Exercise] : exercise [Vitamins/Supplements] : vitamins/supplements [Contraception] : contraception [STD (testing, results, tx)] : STD (testing, results, tx)

## 2019-05-12 LAB
CYTOLOGY CVX/VAG DOC THIN PREP: NORMAL
HPV HIGH+LOW RISK DNA PNL CVX: NOT DETECTED

## 2019-10-02 NOTE — ED CDU PROVIDER SUBSEQUENT DAY NOTE - SKIN NEGATIVE STATEMENT, MLM
no abrasions, no jaundice, no lesions, no pruritis, and no rashes.
PAST SURGICAL HISTORY:  No significant past surgical history

## 2020-08-24 ENCOUNTER — APPOINTMENT (OUTPATIENT)
Dept: OBGYN | Facility: CLINIC | Age: 35
End: 2020-08-24
Payer: COMMERCIAL

## 2020-08-24 VITALS
BODY MASS INDEX: 21.52 KG/M2 | WEIGHT: 142 LBS | DIASTOLIC BLOOD PRESSURE: 76 MMHG | HEIGHT: 68 IN | SYSTOLIC BLOOD PRESSURE: 112 MMHG

## 2020-08-24 PROCEDURE — 99395 PREV VISIT EST AGE 18-39: CPT

## 2020-08-26 LAB — HPV HIGH+LOW RISK DNA PNL CVX: NOT DETECTED

## 2020-08-26 NOTE — COUNSELING
[Breast Self Exam] : breast self exam [Nutrition] : nutrition [STD (testing, results, tx)] : STD (testing, results, tx) [Exercise] : exercise [Vitamins/Supplements] : vitamins/supplements [Contraception] : contraception

## 2020-08-26 NOTE — PHYSICAL EXAM
[Acute Distress] : no acute distress [Awake] : awake [Alert] : alert [Mass] : no breast mass [Soft] : soft [Nipple Discharge] : no nipple discharge [Axillary LAD] : no axillary lymphadenopathy [Oriented x3] : oriented to person, place, and time [Tender] : non tender [No Bleeding] : there was no active vaginal bleeding [Normal] : uterus [Uterine Adnexae] : were not tender and not enlarged [External Hemorrhoid] : no external hemorrhoids

## 2020-09-01 LAB — CYTOLOGY CVX/VAG DOC THIN PREP: NORMAL

## 2020-11-06 NOTE — CONSULT NOTE ADULT - CONSULT REQUESTED DATE/TIME
"You have been tested for COVID-19 today. You will be called with the results of your rapid COVID test within an hour. If this test is positive, follow instructions below. If this test is negative then a confirmatory 2nd test will be sent out and that test can take 3-5  days to come back. We recommend that for now you self-quarantine until your test results are back. If your test is NEGATIVE, you may go back to work as long as you have met the following criteria:   Â· At least 24 hours have passed since fever resolution without use of fever reducing medication and     Â· Improvement of other symptoms     If your test is POSITIVE, then you need to be home from work and remain in isolatioin until the following CDC guidelines/criteria have been met:    Â· At least 24 hours have passed since fever resolution without use of fever reducing medication and   Â· other symptoms have improved (for example, when your cough or shortness of breath have improved) and  Â· At least 10 days have passed since symptoms first appeared  Â· Also - any close or household contacts should be quarantined for 14 days. Coronavirus Disease 2019 (COVID-19): Overview  Coronavirus disease 2019 (COVID-19) is a respiratory illness. It's caused by a new (novel) coronavirus. There are many types of coronavirus. Coronaviruses are a very common cause of colds and bronchitis. They may sometimes cause lung infection (pneumonia). Symptoms can range from mild to severe. Some people have no symptoms. These viruses are also found in some animals. All 50 states in the [place] have reported cases of COVID-19. Most states report ""community spread\"" of COVID-19. This means the source of the illness is not known. COVID-19 is a rapidly-emerging infectious disease. This means that scientists are actively researching it. There are information updates regularly. Public health officials are working to find the source.  How the virus spreads is not yet fully " understood, but it seems to spread and infect people fairly easily. Some people who have been infected in an area may not be sure how or where they were infected. The virus may be spread through droplets of fluid that a person coughs or sneezes into the air. It may be spread if you touch a surface with the virus on it, such as a handle or object, and then touch your eyes, nose, or mouth. For the latest information, visit the CDC website at www.cdc.gov/coronavirus/2019-ncov. Or call 384-XZA-HXZY (020-396-7435). What are the symptoms of COVID-19? Some people have no symptoms or mild symptoms. Symptoms can also vary from person to person. As experts learn more about COVID-19, other symptoms are being reported. Symptoms may appear 2 to 14 days after contact with the virus:   Â· Fever  Â· Coughing  Â· Trouble breathing or feeling short of breath  Â· Sore throat  Â· Runny nose  Â· Headache and body aches  Â· Chills or repeated shaking with chills  Â· Fatigue  Â· Loss of appetite  Â· Nausea, vomiting, diarrhea, or abdominal pain  Â· Loss of sense of smell and taste  You can check your symptoms with the [facility] Coronavirus Self-. What are possible complications from [id number]? In many cases, this virus can cause infection (pneumonia) in both lungs. In some cases, this can cause death. Certain people are at higher risk for complications. This includes older adults and people with serious chronic health conditions such as heart or lung disease, diabetes, or kidney disease. It includes people with health conditions that suppress the immune system. And it includes people taking medicines that suppress the immune system. As experts learn more about PGEEF-26, other complications are being reported that may be linked to COVID-19. Rarely, some children have developed severe complications called multisystem inflammatory syndrome in children (MIS-C).  MIS-C seems to be similar to [facility] disease, a rare condition causing 11-Aug-2018 14:39 inflammation of blood vessels and body organs. It's not yet known if MIS-C happens only in children, or if adults are also at risk. It's also not known if it's related to COVID-19, because many children, but not all, have tested positive for the virus. Experts continue to study MIS-C. The CDC advises healthcare providers to report to local health departments any person under age 24years old who is ill enough to be in the hospital and has all of the following:   Â· A fever over 100.4Â°F (38.0Â°C) for more than 24 hours and a positive SARS-CoV-2 test or exposure to the virus in the last 4 weeks  Â· Inflammation in at least 2 organs such as the heart, lungs, or kidneys with lab tests that show inflammation  Â· No other diagnoses besides COVID-19 explain the child's symptoms  How is COVID-19 diagnosed? Your healthcare provider will ask about your symptoms. He or she will ask where you live, and about your recent travel, and any contact with sick people. If your healthcare provider thinks you may have COVID-19, he or she will consider whether to test you for COVID-19. This depends on the availability of testing in your area, and how sick you are. Follow all instructions from your healthcare provider. Guidelines for testing may change as more information about the virus becomes available. Currently, COVID-19 is diagnosed by:   Â· Nose-throat swab. A swab is wiped inside your nose to the back of your throat. This is a viral test to tell you if you have a current COVID-19 infection. If your healthcare provider thinks or confirms that you have COVID-19, you may have other tests. These tests may include:   Â· Antibody blood test.  Antibody tests are being looked at to find out if a person has previously been infected with the virus and may now have antibodies such as SARS AB IgG in their blood to give some immunity. The accuracy and availability of antibody tests vary.  An antibody test may not be able to show if you have a current infection because it can take up to a few weeks after infection to make antibodies. It's not yet known how long immunity lasts after being infected with the virus. Â· Sputum culture. A small sample of mucus coughed from your lungs (sputum) may be collected if you have a moist cough. It may be checked for the virus or to look for pneumonia. Â· Imaging tests. You may have a chest X-ray or CT scan. How is COVID-19 treated? There is currently no medicine proven to prevent or treat the virus. Some experimental medicines are being tested for COVID-19. Other medicines used to treat other conditions are being looked at for COVID-19, but these are not currently approved to treat it. The most proven treatments right now are those to help your body while it fights the virus. This is known as supportive care. Supportive care may include:   Â· Getting rest.  This helps your body fight the illness. Â· Staying hydrated. Drinking liquids is the best way to prevent dehydration. . Try to drink 6 to 8 glasses of liquids every day, or as advised by your provider. Also check with your provider about which fluids are best for you. Don't drink fluids that contain caffeine or alcohol. Â· Taking over-the-counter (OTC) pain medicine. These are used to help ease pain and reduce fever. Follow your healthcare provider's instructions for which OTC medicine to use. For severe illness, you may need to stay in the hospital. Care during severe illness may include:   Â· IV (intravenous) fluids. These are given through a vein to help keep your body hydrated. Â· Oxygen. You may be given supplemental oxygen or ventilation with a breathing machine (ventilator). This is done so you get enough oxygen in your body. Â· Prone positioning. Depending on how sick you are during your hospital stay, your healthcare team may turn you regularly on your stomach. This is called prone positioning.  It helps increase the amount of oxygen you get to your lungs. Follow your healthcare team's instructions on position changes while you're in the hospital. Also follow their discharge advice on the best positions to help your breathing once you go home. People who have had COVID-19 and are fully recovered may be asked by their healthcare team to consider donating plasma. This is called COVID-19 convalescent plasma donation. Plasma from people fully recovered from COVID-19 may contain antibodies to help fight COVID-19 in people who are currently seriously ill with the disease. It's not fully known if the donated plasma will work well as a treatment, but the FDA is looking at it and has asked the [address] to help with plasma donation and collection. Talk with your provider to learn more about convalescent plasma donation and whether you qualify to donate. Are you at risk for COVID-19? You are at risk for COVID-19 if you have had close contact with someone with the virus, or if you live in or traveled to an area with cases of it. Close contact means being within about 6 feet of someone, or living in the same house or visiting a person who has or may have COVID-19. Some recent studies suggest that COVID-19 may be spread by people who are not showing symptoms. Date last modified: 5/20/2020  Mel[employer] last reviewed this educational content on 1/1/2020  Â© 2812-5976 [employer] [address]. All rights reserved. This information is not intended as a substitute for professional medical care. Always follow your healthcare professional's instructions.

## 2021-03-23 NOTE — PROCEDURE
SUBJECTIVE:-------------------------------------------------------------------------------------------    Faustino Taylor is a [address] y o   female and is here for routine health maintenance  Health Maintenance   Topic Date Due    Hepatitis C Screening  Never done    Pneumococcal Vaccine: Pediatrics (0 to 5 Years) and At-Risk Patients (6 to 59 Years) (1 of 1 - PPSV23) Never done    HIV Screening  Never done    COVID-19 Vaccine (1) Never done    Cervical Cancer Screening  03/15/2018    MAMMOGRAM  11/21/2018    BMI: Followup Plan  06/20/2020    Annual Physical  06/20/2020    Influenza Vaccine (1) 09/01/2020    DTaP,Tdap,and Td Vaccines (2 - Td) 06/21/2021    Depression Screening PHQ  03/23/2022    BMI: Adult  03/23/2022    Colorectal Cancer Screening  01/08/2026    HIB Vaccine  Aged Out    Hepatitis B Vaccine  Aged Out    IPV Vaccine  Aged Out    Hepatitis A Vaccine  Aged Out    Meningococcal ACWY Vaccine  Aged Out    HPV Vaccine  Aged Out     Immunization History   Administered Date(s) Administered    INFLUENZA 10/23/2008, 11/02/2009, 01/18/2016, 11/13/2017    Influenza Quadrivalent, 6-35 Months IM 10/24/2014, 01/18/2016, 11/13/2017    Influenza Split 10/23/2008, 11/02/2009    Influenza, seasonal, injectable 01/08/2013    SARS-CoV-2 / COVID-19 03/20/2021    Tdap 06/21/2011, 02/01/2018         Diet and Physical Activity  Diet: poor diet  Body mass index is 36 15 kg/m²    Exercise: daily      General Health  Hearing:  Is normal  Vision: sees ophthalmologist/optometrist today  Dental:  Is due to see the dentist      Smoker no        ASSESSMENT/PLAN:-------------------------------------------------------------------------------------------    Patient's physical is up-to-date   Immunizations are up-to-date:  7 COVID in 3 weeks  Cancer screenings call JOSE ROBERTO greene for Pap test:  Patient will      Patient instructed on exercise  Patient instructed on healthy choices for diet       NEXT PHYSICAL 1 YEAR          The following portions of the patient's history were reviewed and updated as appropriate: allergies, current medications, past family history, past medical history, past social history, past surgical history and problem list       OBJECTIVE:---------------------------------------------------------------------------------------------------    /84 (BP Location: Left arm, Patient Position: Sitting, Cuff Size: Large)   Pulse 74   Temp 98 2 °F (36 8 °C) (Oral)   Resp 17   Ht 5' 1" (1 549 m)   Wt 86 8 kg (191 lb 4 8 oz)   BMI 36 15 kg/m²   Wt Readings from Last 3 Encounters:   03/23/21 86 8 kg (191 lb 4 8 oz)   06/20/19 83 9 kg (185 lb)   12/01/17 81 2 kg (179 lb)     BP Readings from Last 3 Encounters:   03/23/21 140/84   06/20/19 122/80   12/01/17 122/80     Pulse Readings from Last 3 Encounters:   03/23/21 74   06/20/19 80   12/01/17 78     Body mass index is 36 15 kg/m²  Health Maintenance   Topic Date Due    Hepatitis C Screening  Never done    Pneumococcal Vaccine: Pediatrics (0 to 5 Years) and At-Risk Patients (6 to 59 Years) (1 of 1 - PPSV23) Never done    HIV Screening  Never done    COVID-19 Vaccine (1) Never done    Cervical Cancer Screening  03/15/2018    MAMMOGRAM  11/21/2018    BMI: Followup Plan  06/20/2020    Annual Physical  06/20/2020    Influenza Vaccine (1) 09/01/2020    DTaP,Tdap,and Td Vaccines (2 - Td) 06/21/2021    Depression Screening PHQ  03/23/2022    BMI: Adult  03/23/2022    Colorectal Cancer Screening  01/08/2026    HIB Vaccine  Aged Out    Hepatitis B Vaccine  Aged Out    IPV Vaccine  Aged Out    Hepatitis A Vaccine  Aged Out    Meningococcal ACWY Vaccine  Aged Out    HPV Vaccine  Aged Out       ROS:   12 point review of systems negative            PHYSICAL EXAM:    Gen    No acute distress well-appearing well-nourished appears stated age    Mental status  Good judgment and insight oriented to time person and place, recent and remote memory intact mood and affect normal cooperative and patient is reasonable    HEENT  PERRLA 3 mm, EOMI without nystagmus, TMs clear, turbinates open pink no exudate, pharynx benign, tongue midline    Neck   supple no masses trachea midline positive click normal carotid upstrokes with no bruits    Cor  Regular rhythm without ectopy or murmur, no S3-S4, normal palpation that is no heave lift or thrill    Vascular  No edema, good pedal pulses    Lungs  CTA bilaterally in no respiratory distress no wheezes rhonchi or rales, normal to palpation no tactile fremitus    Abdomen  Soft, no palpable masses, no hepatosplenomegaly, normal bowel sounds, nontender    Lymphatics  No palpable nodes in the neck, supraclavicular area, axilla, or groin     Musculoskeletal  No clubbing cyanosis or edema muscle tone normal 12 point review of systems is negative    Skin  no rashes or abnormal appearing lesions    Neuro  Normal ambulation, cranial nerves 2-12 grossly intact, higher functioning with reasoning intact  [Cervical Pap Smear] : cervical Pap smear [Tolerated Well] : the patient tolerated the procedure well [Liquid Base] : liquid base [No Complications] : there were no complications

## 2022-01-05 ENCOUNTER — NON-APPOINTMENT (OUTPATIENT)
Age: 37
End: 2022-01-05

## 2022-03-01 ENCOUNTER — APPOINTMENT (OUTPATIENT)
Dept: OBGYN | Facility: CLINIC | Age: 37
End: 2022-03-01
Payer: COMMERCIAL

## 2022-03-01 PROCEDURE — 99395 PREV VISIT EST AGE 18-39: CPT

## 2022-03-01 RX ORDER — BUTALBITAL, ACETAMINOPHEN AND CAFFEINE 300; 50; 40 MG/1; MG/1; MG/1
50-300-40 CAPSULE ORAL EVERY 6 HOURS
Qty: 15 | Refills: 0 | Status: DISCONTINUED | COMMUNITY
Start: 2018-08-14 | End: 2022-03-01

## 2022-03-01 RX ORDER — MULTIVIT 47/IRON/FOLATE 1/DHA 27-1-300MG
27-0.6-0.4-3 CAPSULE ORAL
Qty: 1 | Refills: 11 | Status: DISCONTINUED | COMMUNITY
Start: 2018-06-18 | End: 2022-03-01

## 2022-03-01 NOTE — HISTORY OF PRESENT ILLNESS
[FreeTextEntry1] : Check up.  Feels well.  Heavier menses and left breast pain since covid vaccine in left arm.  Weight stable.  Refuses sti screen.

## 2022-03-04 LAB — HPV HIGH+LOW RISK DNA PNL CVX: NOT DETECTED

## 2022-03-07 LAB — CYTOLOGY CVX/VAG DOC THIN PREP: NORMAL

## 2022-03-29 ENCOUNTER — APPOINTMENT (OUTPATIENT)
Dept: OBGYN | Facility: CLINIC | Age: 37
End: 2022-03-29
Payer: COMMERCIAL

## 2022-03-29 VITALS
HEIGHT: 68 IN | WEIGHT: 142 LBS | SYSTOLIC BLOOD PRESSURE: 110 MMHG | DIASTOLIC BLOOD PRESSURE: 62 MMHG | BODY MASS INDEX: 21.52 KG/M2

## 2022-03-29 DIAGNOSIS — N92.0 EXCESSIVE AND FREQUENT MENSTRUATION WITH REGULAR CYCLE: ICD-10-CM

## 2022-03-29 PROCEDURE — 76830 TRANSVAGINAL US NON-OB: CPT

## 2022-10-29 ENCOUNTER — NON-APPOINTMENT (OUTPATIENT)
Age: 37
End: 2022-10-29

## 2022-11-01 ENCOUNTER — APPOINTMENT (OUTPATIENT)
Dept: OBGYN | Facility: CLINIC | Age: 37
End: 2022-11-01

## 2022-11-01 VITALS
SYSTOLIC BLOOD PRESSURE: 120 MMHG | BODY MASS INDEX: 23.04 KG/M2 | HEIGHT: 68 IN | WEIGHT: 152 LBS | DIASTOLIC BLOOD PRESSURE: 70 MMHG

## 2022-11-01 PROCEDURE — 0501F PRENATAL FLOW SHEET: CPT

## 2022-11-01 PROCEDURE — 81025 URINE PREGNANCY TEST: CPT

## 2022-11-01 PROCEDURE — 81003 URINALYSIS AUTO W/O SCOPE: CPT | Mod: QW

## 2022-11-01 PROCEDURE — 90471 IMMUNIZATION ADMIN: CPT

## 2022-11-01 PROCEDURE — 76817 TRANSVAGINAL US OBSTETRIC: CPT

## 2022-11-01 PROCEDURE — 90656 IIV3 VACC NO PRSV 0.5 ML IM: CPT

## 2022-11-02 LAB
BILIRUB UR QL STRIP: NORMAL
C TRACH RRNA SPEC QL NAA+PROBE: NOT DETECTED
GLUCOSE UR-MCNC: NORMAL
HCG UR QL: 0.2 EU/DL
HCG UR QL: POSITIVE
HGB UR QL STRIP.AUTO: NORMAL
KETONES UR-MCNC: NORMAL
LEUKOCYTE ESTERASE UR QL STRIP: NORMAL
N GONORRHOEA RRNA SPEC QL NAA+PROBE: NOT DETECTED
NITRITE UR QL STRIP: NORMAL
PH UR STRIP: 7
PROT UR STRIP-MCNC: NORMAL
QUALITY CONTROL: YES
SOURCE AMPLIFICATION: NORMAL
SOURCE AMPLIFICATION: NORMAL
SP GR UR STRIP: 1.01
T VAGINALIS RRNA SPEC QL NAA+PROBE: NOT DETECTED

## 2022-11-03 LAB — BACTERIA UR CULT: NORMAL

## 2022-11-23 ENCOUNTER — NON-APPOINTMENT (OUTPATIENT)
Age: 37
End: 2022-11-23

## 2022-11-26 ENCOUNTER — TRANSCRIPTION ENCOUNTER (OUTPATIENT)
Age: 37
End: 2022-11-26

## 2022-12-06 ENCOUNTER — APPOINTMENT (OUTPATIENT)
Dept: OBGYN | Facility: CLINIC | Age: 37
End: 2022-12-06

## 2022-12-06 VITALS
DIASTOLIC BLOOD PRESSURE: 82 MMHG | HEIGHT: 68 IN | BODY MASS INDEX: 22.88 KG/M2 | SYSTOLIC BLOOD PRESSURE: 138 MMHG | WEIGHT: 151 LBS

## 2022-12-06 PROCEDURE — 36415 COLL VENOUS BLD VENIPUNCTURE: CPT

## 2022-12-06 PROCEDURE — 76801 OB US < 14 WKS SINGLE FETUS: CPT

## 2022-12-06 PROCEDURE — 0502F SUBSEQUENT PRENATAL CARE: CPT

## 2022-12-07 LAB
BASOPHILS # BLD AUTO: 0.04 K/UL
BASOPHILS NFR BLD AUTO: 0.4 %
EOSINOPHIL # BLD AUTO: 0.12 K/UL
EOSINOPHIL NFR BLD AUTO: 1.3 %
HBV SURFACE AG SERPL QL IA: NONREACTIVE
HCT VFR BLD CALC: 36.5 %
HGB BLD-MCNC: 12.5 G/DL
HIV1+2 AB SPEC QL IA.RAPID: NONREACTIVE
IMM GRANULOCYTES NFR BLD AUTO: 0.3 %
LYMPHOCYTES # BLD AUTO: 3.38 K/UL
LYMPHOCYTES NFR BLD AUTO: 36.9 %
MAN DIFF?: NORMAL
MCHC RBC-ENTMCNC: 31.2 PG
MCHC RBC-ENTMCNC: 34.2 GM/DL
MCV RBC AUTO: 91 FL
MONOCYTES # BLD AUTO: 0.61 K/UL
MONOCYTES NFR BLD AUTO: 6.7 %
NEUTROPHILS # BLD AUTO: 4.97 K/UL
NEUTROPHILS NFR BLD AUTO: 54.4 %
PLATELET # BLD AUTO: 332 K/UL
RBC # BLD: 4.01 M/UL
RBC # FLD: 11.8 %
TSH SERPL-ACNC: 0.8 UIU/ML
WBC # FLD AUTO: 9.15 K/UL

## 2022-12-08 LAB
ABO + RH PNL BLD: NORMAL
BLD GP AB SCN SERPL QL: NORMAL
HGB A MFR BLD: 97.6 %
HGB A2 MFR BLD: 2.4 %
HGB FRACT BLD-IMP: NORMAL
LEAD BLD-MCNC: <1 UG/DL
MEV IGG FLD QL IA: 34.1 AU/ML
MEV IGG+IGM SER-IMP: POSITIVE
RUBV IGG FLD-ACNC: 1.1 INDEX
RUBV IGG SER-IMP: POSITIVE
T PALLIDUM AB SER QL IA: NEGATIVE
VZV AB TITR SER: POSITIVE
VZV IGG SER IF-ACNC: 707 INDEX

## 2022-12-09 LAB
B19V IGG SER QL IA: 2.93 INDEX
B19V IGG+IGM SER-IMP: NORMAL
B19V IGG+IGM SER-IMP: POSITIVE
B19V IGM FLD-ACNC: 0.08 INDEX
B19V IGM SER-ACNC: NEGATIVE
HCV AB SER QL: NONREACTIVE
HCV S/CO RATIO: 0.25 S/CO

## 2022-12-16 ENCOUNTER — NON-APPOINTMENT (OUTPATIENT)
Age: 37
End: 2022-12-16

## 2023-01-03 ENCOUNTER — APPOINTMENT (OUTPATIENT)
Dept: OBGYN | Facility: CLINIC | Age: 38
End: 2023-01-03
Payer: COMMERCIAL

## 2023-01-03 VITALS
WEIGHT: 158 LBS | HEIGHT: 68 IN | SYSTOLIC BLOOD PRESSURE: 120 MMHG | BODY MASS INDEX: 23.95 KG/M2 | DIASTOLIC BLOOD PRESSURE: 80 MMHG

## 2023-01-03 LAB
BILIRUB UR QL STRIP: NORMAL
GLUCOSE UR-MCNC: NORMAL
HCG UR QL: 0.2 EU/DL
HGB UR QL STRIP.AUTO: NORMAL
KETONES UR-MCNC: 40
LEUKOCYTE ESTERASE UR QL STRIP: NORMAL
NITRITE UR QL STRIP: NORMAL
PH UR STRIP: 5.5
PROT UR STRIP-MCNC: NORMAL
SP GR UR STRIP: 1.02

## 2023-01-03 PROCEDURE — 36415 COLL VENOUS BLD VENIPUNCTURE: CPT

## 2023-01-03 PROCEDURE — 0502F SUBSEQUENT PRENATAL CARE: CPT

## 2023-01-03 RX ORDER — TRANEXAMIC ACID 650 MG/1
650 TABLET ORAL EVERY 6 HOURS
Qty: 20 | Refills: 0 | Status: DISCONTINUED | COMMUNITY
Start: 2022-03-29 | End: 2023-01-03

## 2023-01-06 LAB
AFP MOM: 1.33
AFP VALUE: 43.4 NG/ML
ALPHA FETOPROTEIN SERUM COMMENT: NORMAL
ALPHA FETOPROTEIN SERUM INTERPRETATION: NORMAL
ALPHA FETOPROTEIN SERUM RESULTS: NORMAL
ALPHA FETOPROTEIN SERUM TEST RESULTS: NORMAL
GESTATIONAL AGE BASED ON: NORMAL
GESTATIONAL AGE ON COLLECTION DATE: 15.7 WEEKS
INSULIN DEP DIABETES: NO
MATERNAL AGE AT EDD AFP: 36.2 YR
MULTIPLE GESTATION: NO
OSBR RISK 1 IN: 4399
RACE: NORMAL
WEIGHT AFP: 158 LBS

## 2023-01-31 ENCOUNTER — APPOINTMENT (OUTPATIENT)
Dept: OBGYN | Facility: CLINIC | Age: 38
End: 2023-01-31
Payer: COMMERCIAL

## 2023-01-31 ENCOUNTER — NON-APPOINTMENT (OUTPATIENT)
Age: 38
End: 2023-01-31

## 2023-01-31 VITALS
DIASTOLIC BLOOD PRESSURE: 60 MMHG | BODY MASS INDEX: 24.55 KG/M2 | HEIGHT: 68 IN | SYSTOLIC BLOOD PRESSURE: 120 MMHG | WEIGHT: 162 LBS

## 2023-01-31 LAB
BILIRUB UR QL STRIP: NORMAL
GLUCOSE UR-MCNC: NORMAL
HCG UR QL: 0.2 EU/DL
HGB UR QL STRIP.AUTO: NORMAL
KETONES UR-MCNC: NORMAL
LEUKOCYTE ESTERASE UR QL STRIP: NORMAL
NITRITE UR QL STRIP: NORMAL
PH UR STRIP: 5
PROT UR STRIP-MCNC: NORMAL
SP GR UR STRIP: 1.01

## 2023-01-31 PROCEDURE — 0502F SUBSEQUENT PRENATAL CARE: CPT

## 2023-02-03 NOTE — ED CDU PROVIDER DISPOSITION NOTE - NS ED MD DISPO DISCHARGE CCDA
Patient/Caregiver provided printed discharge information. Echo from [facility]: Normal LV function. EF 60%. Small pericardial effusion.

## 2023-02-16 ENCOUNTER — APPOINTMENT (OUTPATIENT)
Dept: ANTEPARTUM | Facility: CLINIC | Age: 38
End: 2023-02-16
Payer: COMMERCIAL

## 2023-02-16 ENCOUNTER — ASOB RESULT (OUTPATIENT)
Age: 38
End: 2023-02-16

## 2023-02-16 PROCEDURE — 76811 OB US DETAILED SNGL FETUS: CPT

## 2023-02-24 ENCOUNTER — APPOINTMENT (OUTPATIENT)
Dept: ANTEPARTUM | Facility: CLINIC | Age: 38
End: 2023-02-24
Payer: COMMERCIAL

## 2023-02-24 ENCOUNTER — ASOB RESULT (OUTPATIENT)
Age: 38
End: 2023-02-24

## 2023-02-24 PROCEDURE — 76816 OB US FOLLOW-UP PER FETUS: CPT

## 2023-02-28 ENCOUNTER — APPOINTMENT (OUTPATIENT)
Dept: OBGYN | Facility: CLINIC | Age: 38
End: 2023-02-28
Payer: COMMERCIAL

## 2023-02-28 VITALS
SYSTOLIC BLOOD PRESSURE: 120 MMHG | DIASTOLIC BLOOD PRESSURE: 62 MMHG | WEIGHT: 169 LBS | HEIGHT: 68 IN | BODY MASS INDEX: 25.61 KG/M2

## 2023-02-28 LAB
BILIRUB UR QL STRIP: NORMAL
GLUCOSE UR-MCNC: NORMAL
HCG UR QL: 0.2 EU/DL
HGB UR QL STRIP.AUTO: NORMAL
KETONES UR-MCNC: 15
LEUKOCYTE ESTERASE UR QL STRIP: NORMAL
NITRITE UR QL STRIP: NORMAL
PH UR STRIP: 7
PROT UR STRIP-MCNC: NORMAL
SP GR UR STRIP: 1.01

## 2023-02-28 PROCEDURE — 0502F SUBSEQUENT PRENATAL CARE: CPT

## 2023-03-28 ENCOUNTER — APPOINTMENT (OUTPATIENT)
Dept: OBGYN | Facility: CLINIC | Age: 38
End: 2023-03-28
Payer: COMMERCIAL

## 2023-03-28 VITALS
HEIGHT: 68 IN | WEIGHT: 173 LBS | BODY MASS INDEX: 26.22 KG/M2 | DIASTOLIC BLOOD PRESSURE: 60 MMHG | SYSTOLIC BLOOD PRESSURE: 120 MMHG

## 2023-03-28 DIAGNOSIS — K59.00 CONSTIPATION, UNSPECIFIED: ICD-10-CM

## 2023-03-28 LAB
BILIRUB UR QL STRIP: NORMAL
GLUCOSE UR-MCNC: NORMAL
HCG UR QL: 0.2 EU/DL
HGB UR QL STRIP.AUTO: NORMAL
KETONES UR-MCNC: NORMAL
LEUKOCYTE ESTERASE UR QL STRIP: NORMAL
NITRITE UR QL STRIP: NORMAL
PH UR STRIP: 5.5
PROT UR STRIP-MCNC: NORMAL
SP GR UR STRIP: 1.02

## 2023-03-28 PROCEDURE — 36415 COLL VENOUS BLD VENIPUNCTURE: CPT

## 2023-03-28 PROCEDURE — 0502F SUBSEQUENT PRENATAL CARE: CPT

## 2023-03-28 PROCEDURE — 90471 IMMUNIZATION ADMIN: CPT

## 2023-03-28 PROCEDURE — 90715 TDAP VACCINE 7 YRS/> IM: CPT

## 2023-03-29 PROBLEM — K59.00 CONSTIPATION: Status: ACTIVE | Noted: 2023-03-28

## 2023-03-29 LAB
GLUCOSE 1H P 50 G GLC PO SERPL-MCNC: 152 MG/DL
HCT VFR BLD CALC: 32.8 %
HGB BLD-MCNC: 10.8 G/DL
MCHC RBC-ENTMCNC: 31.4 PG
MCHC RBC-ENTMCNC: 32.9 GM/DL
MCV RBC AUTO: 95.3 FL
PLATELET # BLD AUTO: 260 K/UL
RBC # BLD: 3.44 M/UL
RBC # FLD: 13.3 %
WBC # FLD AUTO: 8.84 K/UL

## 2023-03-31 ENCOUNTER — NON-APPOINTMENT (OUTPATIENT)
Age: 38
End: 2023-03-31

## 2023-03-31 LAB — T PALLIDUM AB SER QL IA: NEGATIVE

## 2023-04-18 ENCOUNTER — APPOINTMENT (OUTPATIENT)
Dept: OBGYN | Facility: CLINIC | Age: 38
End: 2023-04-18
Payer: COMMERCIAL

## 2023-04-18 VITALS
DIASTOLIC BLOOD PRESSURE: 70 MMHG | SYSTOLIC BLOOD PRESSURE: 112 MMHG | WEIGHT: 176 LBS | BODY MASS INDEX: 26.67 KG/M2 | HEIGHT: 68 IN

## 2023-04-18 LAB
BILIRUB UR QL STRIP: NORMAL
GLUCOSE UR-MCNC: NORMAL
HCG UR QL: 0.2 EU/DL
HGB UR QL STRIP.AUTO: NORMAL
KETONES UR-MCNC: 15
LEUKOCYTE ESTERASE UR QL STRIP: NORMAL
NITRITE UR QL STRIP: NORMAL
PH UR STRIP: 6.5
PROT UR STRIP-MCNC: NORMAL
SP GR UR STRIP: 1.02

## 2023-04-18 PROCEDURE — 0502F SUBSEQUENT PRENATAL CARE: CPT

## 2023-04-18 PROCEDURE — 76816 OB US FOLLOW-UP PER FETUS: CPT

## 2023-05-02 ENCOUNTER — APPOINTMENT (OUTPATIENT)
Dept: OBGYN | Facility: CLINIC | Age: 38
End: 2023-05-02
Payer: COMMERCIAL

## 2023-05-02 VITALS
SYSTOLIC BLOOD PRESSURE: 116 MMHG | WEIGHT: 178 LBS | BODY MASS INDEX: 26.98 KG/M2 | DIASTOLIC BLOOD PRESSURE: 60 MMHG | HEIGHT: 68 IN

## 2023-05-02 LAB
BILIRUB UR QL STRIP: NORMAL
GLUCOSE UR-MCNC: NORMAL
HCG UR QL: 0.2 EU/DL
HGB UR QL STRIP.AUTO: NORMAL
KETONES UR-MCNC: NORMAL
LEUKOCYTE ESTERASE UR QL STRIP: NORMAL
NITRITE UR QL STRIP: NORMAL
PH UR STRIP: 5
PROT UR STRIP-MCNC: NORMAL
SP GR UR STRIP: 1.03

## 2023-05-02 PROCEDURE — 0502F SUBSEQUENT PRENATAL CARE: CPT

## 2023-05-05 ENCOUNTER — APPOINTMENT (OUTPATIENT)
Dept: ANTEPARTUM | Facility: CLINIC | Age: 38
End: 2023-05-05

## 2023-05-16 ENCOUNTER — NON-APPOINTMENT (OUTPATIENT)
Age: 38
End: 2023-05-16

## 2023-05-16 ENCOUNTER — APPOINTMENT (OUTPATIENT)
Dept: OBGYN | Facility: CLINIC | Age: 38
End: 2023-05-16
Payer: COMMERCIAL

## 2023-05-16 VITALS
BODY MASS INDEX: 27.28 KG/M2 | HEIGHT: 68 IN | WEIGHT: 180 LBS | SYSTOLIC BLOOD PRESSURE: 114 MMHG | DIASTOLIC BLOOD PRESSURE: 70 MMHG

## 2023-05-16 PROCEDURE — 0502F SUBSEQUENT PRENATAL CARE: CPT

## 2023-05-19 ENCOUNTER — NON-APPOINTMENT (OUTPATIENT)
Age: 38
End: 2023-05-19

## 2023-05-30 ENCOUNTER — APPOINTMENT (OUTPATIENT)
Dept: OBGYN | Facility: CLINIC | Age: 38
End: 2023-05-30
Payer: COMMERCIAL

## 2023-05-30 VITALS
WEIGHT: 192 LBS | DIASTOLIC BLOOD PRESSURE: 64 MMHG | BODY MASS INDEX: 29.1 KG/M2 | SYSTOLIC BLOOD PRESSURE: 116 MMHG | HEIGHT: 68 IN

## 2023-05-30 PROCEDURE — 76816 OB US FOLLOW-UP PER FETUS: CPT

## 2023-05-30 PROCEDURE — 0502F SUBSEQUENT PRENATAL CARE: CPT

## 2023-05-30 PROCEDURE — 76818 FETAL BIOPHYS PROFILE W/NST: CPT | Mod: 59

## 2023-05-31 LAB
BILIRUB UR QL STRIP: NORMAL
GLUCOSE UR-MCNC: NORMAL
HCG UR QL: 0.2 EU/DL
HGB UR QL STRIP.AUTO: NORMAL
HIV1+2 AB SPEC QL IA.RAPID: NONREACTIVE
KETONES UR-MCNC: NORMAL
LEUKOCYTE ESTERASE UR QL STRIP: NORMAL
NITRITE UR QL STRIP: NORMAL
PH UR STRIP: 5.5
PROT UR STRIP-MCNC: NORMAL
SP GR UR STRIP: 1.01

## 2023-06-04 LAB
GP B STREP DNA SPEC QL NAA+PROBE: NOT DETECTED
SOURCE GBS: NORMAL

## 2023-06-06 ENCOUNTER — NON-APPOINTMENT (OUTPATIENT)
Age: 38
End: 2023-06-06

## 2023-06-06 ENCOUNTER — APPOINTMENT (OUTPATIENT)
Dept: OBGYN | Facility: CLINIC | Age: 38
End: 2023-06-06
Payer: COMMERCIAL

## 2023-06-06 VITALS
DIASTOLIC BLOOD PRESSURE: 60 MMHG | BODY MASS INDEX: 29.55 KG/M2 | SYSTOLIC BLOOD PRESSURE: 122 MMHG | HEIGHT: 68 IN | WEIGHT: 195 LBS

## 2023-06-06 PROCEDURE — 0502F SUBSEQUENT PRENATAL CARE: CPT

## 2023-06-06 PROCEDURE — 76818 FETAL BIOPHYS PROFILE W/NST: CPT

## 2023-06-09 ENCOUNTER — INPATIENT (INPATIENT)
Facility: HOSPITAL | Age: 38
LOS: 3 days | Discharge: HOME | End: 2023-06-13
Attending: OBSTETRICS & GYNECOLOGY | Admitting: OBSTETRICS & GYNECOLOGY
Payer: COMMERCIAL

## 2023-06-09 ENCOUNTER — APPOINTMENT (OUTPATIENT)
Dept: OBGYN | Facility: CLINIC | Age: 38
End: 2023-06-09
Payer: COMMERCIAL

## 2023-06-09 VITALS
DIASTOLIC BLOOD PRESSURE: 82 MMHG | SYSTOLIC BLOOD PRESSURE: 120 MMHG | HEIGHT: 68 IN | WEIGHT: 193 LBS | BODY MASS INDEX: 29.25 KG/M2

## 2023-06-09 VITALS
HEART RATE: 91 BPM | SYSTOLIC BLOOD PRESSURE: 148 MMHG | RESPIRATION RATE: 17 BRPM | DIASTOLIC BLOOD PRESSURE: 85 MMHG | TEMPERATURE: 98 F

## 2023-06-09 DIAGNOSIS — O42.90 PREMATURE RUPTURE OF MEMBRANES, UNSPECIFIED AS TO LENGTH OF TIME BETWEEN RUPTURE AND ONSET OF LABOR, UNSPECIFIED WEEKS OF GESTATION: ICD-10-CM

## 2023-06-09 DIAGNOSIS — O26.899 OTHER SPECIFIED PREGNANCY RELATED CONDITIONS, UNSPECIFIED TRIMESTER: ICD-10-CM

## 2023-06-09 LAB
ALBUMIN SERPL ELPH-MCNC: 3.6 G/DL — SIGNIFICANT CHANGE UP (ref 3.3–5)
ALP SERPL-CCNC: 252 U/L — HIGH (ref 40–120)
ALT FLD-CCNC: 16 U/L — SIGNIFICANT CHANGE UP (ref 4–33)
ANION GAP SERPL CALC-SCNC: 16 MMOL/L — HIGH (ref 7–14)
APPEARANCE UR: CLEAR — SIGNIFICANT CHANGE UP
APTT BLD: 24.8 SEC — LOW (ref 27–36.3)
AST SERPL-CCNC: 40 U/L — HIGH (ref 4–32)
BACTERIA # UR AUTO: ABNORMAL
BASOPHILS # BLD AUTO: 0.03 K/UL — SIGNIFICANT CHANGE UP (ref 0–0.2)
BASOPHILS NFR BLD AUTO: 0.4 % — SIGNIFICANT CHANGE UP (ref 0–2)
BILIRUB SERPL-MCNC: 0.3 MG/DL — SIGNIFICANT CHANGE UP (ref 0.2–1.2)
BILIRUB UR-MCNC: NEGATIVE — SIGNIFICANT CHANGE UP
BLD GP AB SCN SERPL QL: NEGATIVE — SIGNIFICANT CHANGE UP
BUN SERPL-MCNC: 6 MG/DL — LOW (ref 7–23)
CALCIUM SERPL-MCNC: 9 MG/DL — SIGNIFICANT CHANGE UP (ref 8.4–10.5)
CHLORIDE SERPL-SCNC: 102 MMOL/L — SIGNIFICANT CHANGE UP (ref 98–107)
CO2 SERPL-SCNC: 18 MMOL/L — LOW (ref 22–31)
COLOR SPEC: YELLOW — SIGNIFICANT CHANGE UP
COMMENT - URINE: SIGNIFICANT CHANGE UP
COVID-19 SPIKE DOMAIN AB INTERP: POSITIVE
COVID-19 SPIKE DOMAIN ANTIBODY RESULT: >250 U/ML — HIGH
CREAT ?TM UR-MCNC: 84 MG/DL — SIGNIFICANT CHANGE UP
CREAT SERPL-MCNC: 0.49 MG/DL — LOW (ref 0.5–1.3)
DIFF PNL FLD: ABNORMAL
EGFR: 125 ML/MIN/1.73M2 — SIGNIFICANT CHANGE UP
EOSINOPHIL # BLD AUTO: 0.04 K/UL — SIGNIFICANT CHANGE UP (ref 0–0.5)
EOSINOPHIL NFR BLD AUTO: 0.5 % — SIGNIFICANT CHANGE UP (ref 0–6)
EPI CELLS # UR: 4 /HPF — SIGNIFICANT CHANGE UP (ref 0–5)
FIBRINOGEN PPP-MCNC: 385 MG/DL — SIGNIFICANT CHANGE UP (ref 200–465)
GLUCOSE SERPL-MCNC: 71 MG/DL — SIGNIFICANT CHANGE UP (ref 70–99)
GLUCOSE UR QL: NEGATIVE — SIGNIFICANT CHANGE UP
HCT VFR BLD CALC: 36.2 % — SIGNIFICANT CHANGE UP (ref 34.5–45)
HGB BLD-MCNC: 12 G/DL — SIGNIFICANT CHANGE UP (ref 11.5–15.5)
HYALINE CASTS # UR AUTO: 1 /LPF — SIGNIFICANT CHANGE UP (ref 0–7)
IANC: 5.09 K/UL — SIGNIFICANT CHANGE UP (ref 1.8–7.4)
IMM GRANULOCYTES NFR BLD AUTO: 1.2 % — HIGH (ref 0–0.9)
INR BLD: <0.9 RATIO — SIGNIFICANT CHANGE UP (ref 0.88–1.16)
KETONES UR-MCNC: ABNORMAL
LDH SERPL L TO P-CCNC: 444 U/L — HIGH (ref 135–225)
LEUKOCYTE ESTERASE UR-ACNC: NEGATIVE — SIGNIFICANT CHANGE UP
LYMPHOCYTES # BLD AUTO: 2.33 K/UL — SIGNIFICANT CHANGE UP (ref 1–3.3)
LYMPHOCYTES # BLD AUTO: 28.8 % — SIGNIFICANT CHANGE UP (ref 13–44)
MCHC RBC-ENTMCNC: 31.2 PG — SIGNIFICANT CHANGE UP (ref 27–34)
MCHC RBC-ENTMCNC: 33.1 GM/DL — SIGNIFICANT CHANGE UP (ref 32–36)
MCV RBC AUTO: 94 FL — SIGNIFICANT CHANGE UP (ref 80–100)
MONOCYTES # BLD AUTO: 0.5 K/UL — SIGNIFICANT CHANGE UP (ref 0–0.9)
MONOCYTES NFR BLD AUTO: 6.2 % — SIGNIFICANT CHANGE UP (ref 2–14)
NEUTROPHILS # BLD AUTO: 5.09 K/UL — SIGNIFICANT CHANGE UP (ref 1.8–7.4)
NEUTROPHILS NFR BLD AUTO: 62.9 % — SIGNIFICANT CHANGE UP (ref 43–77)
NITRITE UR-MCNC: NEGATIVE — SIGNIFICANT CHANGE UP
NRBC # BLD: 0 /100 WBCS — SIGNIFICANT CHANGE UP (ref 0–0)
NRBC # FLD: 0 K/UL — SIGNIFICANT CHANGE UP (ref 0–0)
PH UR: 6.5 — SIGNIFICANT CHANGE UP (ref 5–8)
PLATELET # BLD AUTO: 239 K/UL — SIGNIFICANT CHANGE UP (ref 150–400)
POTASSIUM SERPL-MCNC: 4.8 MMOL/L — SIGNIFICANT CHANGE UP (ref 3.5–5.3)
POTASSIUM SERPL-SCNC: 4.8 MMOL/L — SIGNIFICANT CHANGE UP (ref 3.5–5.3)
PROT ?TM UR-MCNC: 25 MG/DL — SIGNIFICANT CHANGE UP
PROT ?TM UR-MCNC: 25 MG/DL — SIGNIFICANT CHANGE UP
PROT SERPL-MCNC: 7.1 G/DL — SIGNIFICANT CHANGE UP (ref 6–8.3)
PROT UR-MCNC: ABNORMAL
PROT/CREAT UR-RTO: 0.3 RATIO — HIGH (ref 0–0.2)
PROTHROM AB SERPL-ACNC: 10.2 SEC — LOW (ref 10.5–13.4)
RBC # BLD: 3.85 M/UL — SIGNIFICANT CHANGE UP (ref 3.8–5.2)
RBC # FLD: 13.1 % — SIGNIFICANT CHANGE UP (ref 10.3–14.5)
RBC CASTS # UR COMP ASSIST: 2 /HPF — SIGNIFICANT CHANGE UP (ref 0–4)
RH IG SCN BLD-IMP: POSITIVE — SIGNIFICANT CHANGE UP
SARS-COV-2 IGG+IGM SERPL QL IA: >250 U/ML — HIGH
SARS-COV-2 IGG+IGM SERPL QL IA: POSITIVE
SODIUM SERPL-SCNC: 136 MMOL/L — SIGNIFICANT CHANGE UP (ref 135–145)
SP GR SPEC: 1.02 — SIGNIFICANT CHANGE UP (ref 1.01–1.05)
URATE SERPL-MCNC: 3 MG/DL — SIGNIFICANT CHANGE UP (ref 2.5–7)
UROBILINOGEN FLD QL: SIGNIFICANT CHANGE UP
WBC # BLD: 8.09 K/UL — SIGNIFICANT CHANGE UP (ref 3.8–10.5)
WBC # FLD AUTO: 8.09 K/UL — SIGNIFICANT CHANGE UP (ref 3.8–10.5)
WBC UR QL: 5 /HPF — SIGNIFICANT CHANGE UP (ref 0–5)

## 2023-06-09 PROCEDURE — 0502F SUBSEQUENT PRENATAL CARE: CPT

## 2023-06-09 PROCEDURE — 76818 FETAL BIOPHYS PROFILE W/NST: CPT

## 2023-06-09 RX ORDER — OXYTOCIN 10 UNIT/ML
333.33 VIAL (ML) INJECTION
Qty: 20 | Refills: 0 | Status: DISCONTINUED | OUTPATIENT
Start: 2023-06-09 | End: 2023-06-10

## 2023-06-09 RX ORDER — OXYTOCIN 10 UNIT/ML
VIAL (ML) INJECTION
Qty: 30 | Refills: 0 | Status: DISCONTINUED | OUTPATIENT
Start: 2023-06-09 | End: 2023-06-10

## 2023-06-09 RX ORDER — PANTOPRAZOLE SODIUM 20 MG/1
1 TABLET, DELAYED RELEASE ORAL
Refills: 0 | DISCHARGE

## 2023-06-09 RX ORDER — FERROUS SULFATE 325(65) MG
1 TABLET ORAL
Refills: 0 | DISCHARGE

## 2023-06-09 RX ORDER — CHLORHEXIDINE GLUCONATE 213 G/1000ML
1 SOLUTION TOPICAL DAILY
Refills: 0 | Status: DISCONTINUED | OUTPATIENT
Start: 2023-06-09 | End: 2023-06-10

## 2023-06-09 RX ORDER — SODIUM CHLORIDE 9 MG/ML
1000 INJECTION, SOLUTION INTRAVENOUS
Refills: 0 | Status: DISCONTINUED | OUTPATIENT
Start: 2023-06-09 | End: 2023-06-10

## 2023-06-09 RX ADMIN — CHLORHEXIDINE GLUCONATE 1 APPLICATION(S): 213 SOLUTION TOPICAL at 18:30

## 2023-06-09 RX ADMIN — Medication 2 MILLIUNIT(S)/MIN: at 18:43

## 2023-06-09 NOTE — OB PROVIDER LABOR PROGRESS NOTE - ASSESSMENT
A/P 38y  @38w1d IOL for PROM  -IOL: c/w piticon  -Cat 1 tracing  -GBS neg  -EFW 3880  -Analgesia epi  -Anticipate     d/w Dr. Harris Simons, PGY-1

## 2023-06-09 NOTE — OB RN TRIAGE NOTE - FALL HARM RISK - UNIVERSAL INTERVENTIONS
Bed in lowest position, wheels locked, appropriate side rails in place/Call bell, personal items and telephone in reach/Instruct patient to call for assistance before getting out of bed or chair/Non-slip footwear when patient is out of bed/[place] to call system/Physically safe environment - no spills, clutter or unnecessary equipment/Purposeful Proactive Rounding/Room/bathroom lighting operational, light cord in reach

## 2023-06-09 NOTE — OB PROVIDER H&P - NS_PELVICEXAM_OBGYN_ALL_OB
Yes

## 2023-06-09 NOTE — OB PROVIDER H&P - NSHPPHYSICALEXAM_GEN_ALL_CORE
Vital Signs Last 24 Hrs  T(C): 37.0 (06-09-23 @ 18:09), Max: 37.0 (06-09-23 @ 18:09)  T(F): 98.6 (06-09-23 @ 18:09), Max: 98.6 (06-09-23 @ 18:09)  HR: 94 (06-09-23 @ 18:10) (80 - 94)  BP: 136/81 (06-09-23 @ 18:10) (119/74 - 148/85)  BP(mean): --  RR: 17 (06-09-23 @ 15:13) (17 - 17)  SpO2: --    abdomen soft, nontender  taus: sliup, cephalic presentation, posterior placenta, bpp 8/8, MVP 2.8 m mode   sse: +pooling/nitrazine equivocol/+ferning  sve: 3/70/-3/+forebag  nst reactive  toco: occ ctx noted

## 2023-06-09 NOTE — OB RN TRIAGE NOTE - NSICDXPASTMEDICALHX_GEN_ALL_CORE_FT
PAST MEDICAL HISTORY:  H/O exploratory thoracotomy     History of pneumothorax     No pertinent past medical history

## 2023-06-09 NOTE — OB RN PATIENT PROFILE - FALL HARM RISK - UNIVERSAL INTERVENTIONS
Bed in lowest position, wheels locked, appropriate side rails in place/Call bell, personal items and telephone in reach/Instruct patient to call for assistance before getting out of bed or chair/Non-slip footwear when patient is out of bed/[place] to call system/Physically safe environment - no spills, clutter or unnecessary equipment/Purposeful Proactive Rounding/Room/bathroom lighting operational, light cord in reach

## 2023-06-09 NOTE — OB RN TRIAGE NOTE - NSICDXFAMILYHX_GEN_ALL_CORE_FT
FAMILY HISTORY:  Family history of acute myocardial infarction, Mom MI at age 55  Family history of migraine

## 2023-06-09 NOTE — OB PROVIDER TRIAGE NOTE - BIRTH SEX
Medication:  HYDROcodone-acetaminophen (NORCO) 7.5-325 MG per tablet   Provider: Jd Connors MD  Pharmacy:  [facility]    Preferred Contact Number:  [phone] (mobile)    Who will be picking up: Patient    PLEASE GIVE TO DOCTOR ON CALL, PLEASE CALL PATIENT WHEN DONE TOMORROW OR IF YOU HAVE QUESTIONS    Advised patient that the nurse will call if there is a problem with the refill. Patient advised to allow 48/72 hours for the refill completion.          Female

## 2023-06-09 NOTE — OB PROVIDER TRIAGE NOTE - NSOBPROVIDERNOTE_OBGYN_ALL_OB_FT
a/p: 38 y.o.  JOSÉ 2023 @ 38.1 weeks prolonged ROM for IOL    GBS negative 2023  discussed with Dr Iglesias. Plan for admission to L+D, IOL with pitocin  plan of care reviewed with patient and patient   consents reviewed and signed    RENITA Chiu

## 2023-06-09 NOTE — OB RN PATIENT PROFILE - NS PRO DEPRESSION SCREENING Y/N1
Pt is requesting lisinopril-hctz 20/25 mg  Pantoprazole 40 mg  Norvasc 5 mg    Pt was a no show on 8/16/17 and does not have a appt scheduled. Pt has medicaid.    no

## 2023-06-09 NOTE — OB PROVIDER H&P - HISTORY OF PRESENT ILLNESS
38 y.o.  JOSÉ 2023 @ 38.1 weeks presents from OB office for IOL 2/2 oligohydramnios, lupis 3 in OB office. Pt states +LOF x 1 week and mild, occasional cramping. Pt denies VB, fever, chills, nausea, vomiting. States +FM and uncomplicated antepartum course.    allergies:  NKDA    medications:  prenatal vitamins  pantoprazole 20 mg PO daily  ferrous sulfate    med hx:   hx unprovoked spontaneous pneumothorax - pt states no further follow up  2006 thoracoscopy - piece of glove found?    surg hx:  denies    OBGYN hx:  2019  8lbs 9 oz (M)    psychosocial hx:  denies anxiety, depression    ETOH/tobacco/illicit drug use:  denies

## 2023-06-09 NOTE — OB PROVIDER LABOR PROGRESS NOTE - NS_SUBJECTIVE/OBJECTIVE_OBGYN_ALL_OB_FT
Patient and  seen at bedside   Introduced myself as covering physician  Patient has no questions regarding plan of care at this time
R1 Labor Note    S: Patient evaluated at bedside for cervical change.     O:  T(C): 37 (06-09-23 @ 18:31), Max: 37.0 (06-09-23 @ 18:09)  HR: 82 (06-09-23 @ 23:38) (62 - 100)  BP: 144/82 (06-09-23 @ 23:38) (115/73 - 152/95)  RR: 16 (06-09-23 @ 18:31) (16 - 17)  SpO2: 100% (06-09-23 @ 23:37) (88% - 100%)

## 2023-06-10 ENCOUNTER — TRANSCRIPTION ENCOUNTER (OUTPATIENT)
Age: 38
End: 2023-06-10

## 2023-06-10 LAB — T PALLIDUM AB TITR SER: NEGATIVE — SIGNIFICANT CHANGE UP

## 2023-06-10 PROCEDURE — 59400 OBSTETRICAL CARE: CPT

## 2023-06-10 RX ORDER — BENZOCAINE 10 %
1 GEL (GRAM) MUCOUS MEMBRANE EVERY 6 HOURS
Refills: 0 | Status: DISCONTINUED | OUTPATIENT
Start: 2023-06-10 | End: 2023-06-13

## 2023-06-10 RX ORDER — IBUPROFEN 200 MG
600 TABLET ORAL EVERY 6 HOURS
Refills: 0 | Status: DISCONTINUED | OUTPATIENT
Start: 2023-06-10 | End: 2023-06-13

## 2023-06-10 RX ORDER — PRAMOXINE HYDROCHLORIDE 150 MG/15G
1 AEROSOL, FOAM RECTAL EVERY 4 HOURS
Refills: 0 | Status: DISCONTINUED | OUTPATIENT
Start: 2023-06-10 | End: 2023-06-13

## 2023-06-10 RX ORDER — OXYTOCIN 10 UNIT/ML
41.67 VIAL (ML) INJECTION
Qty: 20 | Refills: 0 | Status: DISCONTINUED | OUTPATIENT
Start: 2023-06-10 | End: 2023-06-12

## 2023-06-10 RX ORDER — ACETAMINOPHEN 500 MG
975 TABLET ORAL
Refills: 0 | Status: DISCONTINUED | OUTPATIENT
Start: 2023-06-10 | End: 2023-06-13

## 2023-06-10 RX ORDER — SODIUM CHLORIDE 9 MG/ML
3 INJECTION INTRAMUSCULAR; INTRAVENOUS; SUBCUTANEOUS EVERY 8 HOURS
Refills: 0 | Status: DISCONTINUED | OUTPATIENT
Start: 2023-06-10 | End: 2023-06-13

## 2023-06-10 RX ORDER — OXYCODONE HYDROCHLORIDE 5 MG/1
5 TABLET ORAL
Refills: 0 | Status: DISCONTINUED | OUTPATIENT
Start: 2023-06-10 | End: 2023-06-13

## 2023-06-10 RX ORDER — DIBUCAINE 1 %
1 OINTMENT (GRAM) RECTAL EVERY 6 HOURS
Refills: 0 | Status: DISCONTINUED | OUTPATIENT
Start: 2023-06-10 | End: 2023-06-13

## 2023-06-10 RX ORDER — LANOLIN
1 OINTMENT (GRAM) TOPICAL EVERY 6 HOURS
Refills: 0 | Status: DISCONTINUED | OUTPATIENT
Start: 2023-06-10 | End: 2023-06-13

## 2023-06-10 RX ORDER — SIMETHICONE 80 MG/1
80 TABLET, CHEWABLE ORAL EVERY 4 HOURS
Refills: 0 | Status: DISCONTINUED | OUTPATIENT
Start: 2023-06-10 | End: 2023-06-13

## 2023-06-10 RX ORDER — AER TRAVELER 0.5 G/1
1 SOLUTION RECTAL; TOPICAL EVERY 4 HOURS
Refills: 0 | Status: DISCONTINUED | OUTPATIENT
Start: 2023-06-10 | End: 2023-06-13

## 2023-06-10 RX ORDER — IBUPROFEN 200 MG
600 TABLET ORAL EVERY 6 HOURS
Refills: 0 | Status: COMPLETED | OUTPATIENT
Start: 2023-06-10 | End: 2024-05-08

## 2023-06-10 RX ORDER — OXYCODONE HYDROCHLORIDE 5 MG/1
5 TABLET ORAL ONCE
Refills: 0 | Status: DISCONTINUED | OUTPATIENT
Start: 2023-06-10 | End: 2023-06-13

## 2023-06-10 RX ORDER — MAGNESIUM HYDROXIDE 400 MG/1
30 TABLET, CHEWABLE ORAL
Refills: 0 | Status: DISCONTINUED | OUTPATIENT
Start: 2023-06-10 | End: 2023-06-13

## 2023-06-10 RX ORDER — HYDROCORTISONE 1 %
1 OINTMENT (GRAM) TOPICAL EVERY 6 HOURS
Refills: 0 | Status: DISCONTINUED | OUTPATIENT
Start: 2023-06-10 | End: 2023-06-13

## 2023-06-10 RX ORDER — KETOROLAC TROMETHAMINE 30 MG/ML
30 SYRINGE (ML) INJECTION ONCE
Refills: 0 | Status: DISCONTINUED | OUTPATIENT
Start: 2023-06-10 | End: 2023-06-12

## 2023-06-10 RX ORDER — TETANUS TOXOID, REDUCED DIPHTHERIA TOXOID AND ACELLULAR PERTUSSIS VACCINE, ADSORBED 5; 2.5; 8; 8; 2.5 [IU]/.5ML; [IU]/.5ML; UG/.5ML; UG/.5ML; UG/.5ML
0.5 SUSPENSION INTRAMUSCULAR ONCE
Refills: 0 | Status: DISCONTINUED | OUTPATIENT
Start: 2023-06-10 | End: 2023-06-13

## 2023-06-10 RX ORDER — DIPHENHYDRAMINE HCL 50 MG
25 CAPSULE ORAL EVERY 6 HOURS
Refills: 0 | Status: DISCONTINUED | OUTPATIENT
Start: 2023-06-10 | End: 2023-06-13

## 2023-06-10 RX ADMIN — SODIUM CHLORIDE 3 MILLILITER(S): 9 INJECTION INTRAMUSCULAR; INTRAVENOUS; SUBCUTANEOUS at 21:44

## 2023-06-10 RX ADMIN — Medication 1 TABLET(S): at 11:20

## 2023-06-10 RX ADMIN — Medication 975 MILLIGRAM(S): at 22:00

## 2023-06-10 RX ADMIN — Medication 975 MILLIGRAM(S): at 08:05

## 2023-06-10 RX ADMIN — Medication 600 MILLIGRAM(S): at 11:20

## 2023-06-10 RX ADMIN — Medication 600 MILLIGRAM(S): at 12:30

## 2023-06-10 RX ADMIN — Medication 975 MILLIGRAM(S): at 14:21

## 2023-06-10 RX ADMIN — Medication 975 MILLIGRAM(S): at 21:57

## 2023-06-10 RX ADMIN — Medication 600 MILLIGRAM(S): at 18:35

## 2023-06-10 RX ADMIN — Medication 600 MILLIGRAM(S): at 17:21

## 2023-06-10 RX ADMIN — SODIUM CHLORIDE 3 MILLILITER(S): 9 INJECTION INTRAMUSCULAR; INTRAVENOUS; SUBCUTANEOUS at 14:20

## 2023-06-10 RX ADMIN — Medication 975 MILLIGRAM(S): at 09:17

## 2023-06-10 RX ADMIN — Medication 975 MILLIGRAM(S): at 15:30

## 2023-06-10 NOTE — DISCHARGE NOTE OB - CARE PLAN
1 Principal Discharge DX:	 (normal spontaneous vaginal delivery)  Assessment and plan of treatment:	- continue routine pain mgt.  - continue perineal care   - pelvic rest for 6 wk  - Patient to follow up in 6 wk  - Please notify MD if you experience persistent and overwhelming emotions inhibiting daily activities or infant care, persistent headache, excessive vaginal bleeding, foul smelling vaginal discharge, Urinary urgency/frequency/burning, or localized lower extremity swelling/warmth/redness.   Principal Discharge DX:	 (normal spontaneous vaginal delivery)  Assessment and plan of treatment:	- continue routine pain mgt.  - continue perineal care   - pelvic rest for 6 wk  - Patient to follow up in 6 wk  - Please notify MD if you experience persistent and overwhelming emotions inhibiting daily activities or infant care, persistent headache, excessive vaginal bleeding, foul smelling vaginal discharge, Urinary urgency/frequency/burning, or localized lower extremity swelling/warmth/redness.  Secondary Diagnosis:	Severe preeclampsia, third trimester  Secondary Diagnosis:	Preeclampsia, third trimester

## 2023-06-10 NOTE — OB PROVIDER DELIVERY SUMMARY - NSPROVIDERDELIVERYNOTE_OBGYN_ALL_OB_FT
of a live infant female in MAYE position   Infant head delivered with maternal force followed by easy delivery of anterior shoulder, posterior shoulder, and the rest of the infant's body.  Delayed cord clamping performed for 1 minute. Cord clamped and cut with a segment saved for cord gas labs. Placenta delivered spontaneously and was found to be intact with a three vessel cord. Pitocin infused and fundal massage performed for uterine contraction.   Perineum, vaginal walls and cervix examined with a first degree perineal laceration found and repaired in usual fashion.     Josseline Iglesias DO

## 2023-06-10 NOTE — OB RN DELIVERY SUMMARY - NSSELHIDDEN_OBGYN_ALL_OB_FT
[NS_DeliveryAttending1_OBGYN_ALL_OB_FT:[id number]],[NS_DeliveryRN_OBGYN_ALL_OB_FT:[id number]=]

## 2023-06-10 NOTE — PROGRESS NOTE ADULT - SUBJECTIVE AND OBJECTIVE BOX
post epidural d/c follow-up:    Pt states ambulating and freely voiding.  Denies headache/nausea/vomiting.  VSS.  States no concerns at this time.  no apparent anesthesia complications      ICU Vital Signs Last 24 Hrs  T(C): 36.9 (10 Clay 2023 14:00), Max: 37.1 (10 Clay 2023 10:00)  T(F): 98.5 (10 Clay 2023 14:00), Max: 98.7 (10 Clay 2023 10:00)  HR: 80 (10 Clay 2023 14:00) (60 - 100)  BP: 127/82 (10 Clay 2023 14:00) (108/69 - 153/78)  BP(mean): --  ABP: --  ABP(mean): --  RR: 18 (10 Clay 2023 14:00) (16 - 18)  SpO2: 99% (10 Clay 2023 14:00) (79% - 100%)    O2 Parameters below as of 10 Clay 2023 14:00  Patient On (Oxygen Delivery Method): room air

## 2023-06-10 NOTE — OB RN DELIVERY SUMMARY - NS_SEPSISRSKCALC_OBGYN_ALL_OB_FT
EOS calculated successfully. EOS Risk Factor: 0.5/1000 live births ([facility] national incidence); GA=38w2d; Temp=98.6; PCW=393.3; GBS='Negative'; Antibiotics='No antibiotics or any antibiotics < 2 hrs prior to birth'

## 2023-06-10 NOTE — DISCHARGE NOTE OB - CARE PROVIDER_API CALL
Valdez Grant  Obstetrics and Gynecology  [address], [address]  [place] [address]  Phone: [phone]  Fax: [phone]  Established Patient  Follow Up Time:

## 2023-06-10 NOTE — DISCHARGE NOTE OB - MEDICATION SUMMARY - MEDICATIONS TO TAKE
I will START or STAY ON the medications listed below when I get home from the hospital:    Electronic Blood Pressure Cuff  -- Check blood pressures three times a day. Notify MD if blood pressure more than 140/90. Return to hospital for blood pressures greater than 160/110.  -- Indication: For Preeclampsia w/o Severe features    ibuprofen 600 mg oral tablet  -- 1 tab(s) by mouth every 6 hours  -- Indication: For  (normal spontaneous vaginal delivery)    acetaminophen 325 mg oral tablet  -- 3 tab(s) by mouth every 6 hours  -- Indication: For  (normal spontaneous vaginal delivery)    Prenatal Multivitamins with Folic Acid 1 mg oral tablet  -- 1 tab(s) by mouth once a day  -- Indication: For  (normal spontaneous vaginal delivery)    pantoprazole 20 mg oral delayed release tablet  -- 1 by mouth once a day  -- Indication: For H/O GERD   I will START or STAY ON the medications listed below when I get home from the hospital:    Electronic Blood Pressure Cuff  -- Check blood pressures three times a day. Notify MD if blood pressure more than 140/90. Return to hospital for blood pressures greater than 160/110.  -- Indication: For Preeclampsia w/o Severe features    ibuprofen 600 mg oral tablet  -- 1 tab(s) by mouth every 6 hours  -- Indication: For  (normal spontaneous vaginal delivery)    acetaminophen 325 mg oral tablet  -- 3 tab(s) by mouth every 6 hours  -- Indication: For  (normal spontaneous vaginal delivery)    NIFEdipine 30 mg oral tablet, extended release  -- 1 tab(s) by mouth once a day  -- Indication: For Other pregnancy-related conditions, antepartum    Prenatal Multivitamins with Folic Acid 1 mg oral tablet  -- 1 tab(s) by mouth once a day  -- Indication: For  (normal spontaneous vaginal delivery)    pantoprazole 20 mg oral delayed release tablet  -- 1 by mouth once a day  -- Indication: For H/O GERD

## 2023-06-10 NOTE — DISCHARGE NOTE OB - MEDICATION SUMMARY - MEDICATIONS TO STOP TAKING
good balance
I will STOP taking the medications listed below when I get home from the hospital:  None

## 2023-06-10 NOTE — DISCHARGE NOTE OB - PATIENT PORTAL LINK FT
You can access the FollowMyHealth Patient Portal offered by [facility] by registering at the following website: [email or web address]. By joining [facility] FollowMyHealth portal, you will also be able to view your health information using other applications (apps) compatible with our system.

## 2023-06-11 RX ADMIN — Medication 600 MILLIGRAM(S): at 17:13

## 2023-06-11 RX ADMIN — SODIUM CHLORIDE 3 MILLILITER(S): 9 INJECTION INTRAMUSCULAR; INTRAVENOUS; SUBCUTANEOUS at 04:31

## 2023-06-11 RX ADMIN — Medication 600 MILLIGRAM(S): at 17:47

## 2023-06-11 RX ADMIN — Medication 600 MILLIGRAM(S): at 05:46

## 2023-06-11 RX ADMIN — Medication 600 MILLIGRAM(S): at 12:30

## 2023-06-11 RX ADMIN — Medication 600 MILLIGRAM(S): at 06:00

## 2023-06-11 RX ADMIN — Medication 600 MILLIGRAM(S): at 12:00

## 2023-06-11 NOTE — PROGRESS NOTE ADULT - SUBJECTIVE AND OBJECTIVE BOX
OB Progress Note:  PPD#1    S: Patient feels well. Pain is well controlled. She is tolerating a regular diet and passing flatus. She is voiding spontaneously, and ambulating without difficulty. Denies HA, SOB, CP, RUQ/epigastric pain, b/l swelling LE and UE, or vision changes. Denies HA/lightheadedness/dizziness. Denies N/V. Denies calf pain.    O:  Vitals:  Vital Signs Last 24 Hrs  T(C): 36.7 (2023 05:34), Max: 37.1 (10 Clay 2023 10:00)  T(F): 98 (2023 05:34), Max: 98.7 (10 Clay 2023 10:00)  HR: 84 (2023 05:34) (68 - 84)  BP: 131/83 (2023 05:34) (108/69 - 136/81)  BP(mean): --  RR: 16 (2023 05:34) (16 - 18)  SpO2: 100% (2023 05:34) (98% - 100%)    Parameters below as of 2023 05:34  Patient On (Oxygen Delivery Method): room air    Physical Exam:  General: NAD  Abdomen: soft, non-tender, non-distended, fundus firm  Vaginal: lochia wnl  Extremities: no erythema/calf tenderness    MEDICATIONS  (STANDING):  acetaminophen     Tablet .. 975 milliGRAM(s) Oral <User Schedule>  diphtheria/tetanus/pertussis (acellular) Vaccine (Adacel) 0.5 milliLiter(s) IntraMuscular once  ibuprofen  Tablet. 600 milliGRAM(s) Oral every 6 hours  ketorolac   Injectable 30 milliGRAM(s) IV Push once  oxytocin Infusion 41.667 milliUNIT(s)/Min (125 mL/Hr) IV Continuous <Continuous>  prenatal multivitamin 1 Tablet(s) Oral daily  sodium chloride 0.9% lock flush 3 milliLiter(s) IV Push every 8 hours      Labs:  Blood type: O Positive  Rubella IgG: RPR: Negative                          12.0   8.09 >-----------< 239    (  @ 17:50 )             36.2    - @ 17:50      136  |  102  |  6<L>  ----------------------------<  71  4.8   |  18<L>  |  0.49<L>        Ca    9.0      2023 17:50    TPro  7.1  /  Alb  3.6  /  TBili  0.3  /  DBili  x   /  AST  40<H>  /  ALT  16  /  AlkPhos  252<H>  23 @ 17:50

## 2023-06-11 NOTE — PROGRESS NOTE ADULT - ASSESSMENT
A/P: 39yo PPD#1 s/p . Course c/b Preeclampsia. Patient is stable and doing well post-partum.     #Preeclampsia  - BP's well-controlled overnight, 120-130/70-80  - Baseline HELLP: AST/ALT , P/C 0.3  - Continue to monitor BP's    #Routine Postpartum Care  - Continue with PO analgesia  - Increase ambulation  - Continue regular diet  - No labs    Brittany Hawthorne, PGY1

## 2023-06-11 NOTE — CHART NOTE - NSCHARTNOTEFT_GEN_A_CORE
AN  Patient seen   5cm  8#8  prolonged decel  resuscitation and terb given  Pitocin  was on 2 and just started.  Will monitor   KWESI Grant

## 2023-06-12 ENCOUNTER — NON-APPOINTMENT (OUTPATIENT)
Age: 38
End: 2023-06-12

## 2023-06-12 PROBLEM — Z98.890 OTHER SPECIFIED POSTPROCEDURAL STATES: Chronic | Status: ACTIVE | Noted: 2023-06-09

## 2023-06-12 PROBLEM — Z87.09 PERSONAL HISTORY OF OTHER DISEASES OF THE RESPIRATORY SYSTEM: Chronic | Status: ACTIVE | Noted: 2023-06-09

## 2023-06-12 RX ORDER — NIFEDIPINE 30 MG
30 TABLET, EXTENDED RELEASE 24 HR ORAL DAILY
Refills: 0 | Status: DISCONTINUED | OUTPATIENT
Start: 2023-06-12 | End: 2023-06-13

## 2023-06-12 RX ORDER — ACETAMINOPHEN 500 MG
3 TABLET ORAL
Qty: 0 | Refills: 0 | DISCHARGE
Start: 2023-06-12

## 2023-06-12 RX ORDER — FERROUS SULFATE 325(65) MG
1 TABLET ORAL
Refills: 0 | DISCHARGE

## 2023-06-12 RX ORDER — IBUPROFEN 200 MG
1 TABLET ORAL
Qty: 0 | Refills: 0 | DISCHARGE
Start: 2023-06-12

## 2023-06-12 RX ADMIN — Medication 600 MILLIGRAM(S): at 05:29

## 2023-06-12 RX ADMIN — Medication 975 MILLIGRAM(S): at 02:49

## 2023-06-12 RX ADMIN — SODIUM CHLORIDE 3 MILLILITER(S): 9 INJECTION INTRAMUSCULAR; INTRAVENOUS; SUBCUTANEOUS at 16:51

## 2023-06-12 RX ADMIN — Medication 600 MILLIGRAM(S): at 06:21

## 2023-06-12 RX ADMIN — Medication 975 MILLIGRAM(S): at 03:20

## 2023-06-12 RX ADMIN — Medication 30 MILLIGRAM(S): at 17:59

## 2023-06-12 NOTE — PROGRESS NOTE ADULT - SUBJECTIVE AND OBJECTIVE BOX
S: 37yo PPD#2 s/p  c/b PEC w/o severe features (HELLP WNL, P/C 0.3). Patient feels well. Pain is well controlled. She is tolerating a regular diet and passing flatus. She is voiding spontaneously, and ambulating without difficulty. Denies CP/SOB. Denies lightheadedness/dizziness. Denies N/V.        O:  Vitals:   Vital Signs Last 24 Hrs  T(C): 36.5 (2023 06:09), Max: 37.1 (2023 18:24)  T(F): 97.7 (2023 06:09), Max: 98.8 (2023 18:24)  HR: 70 (2023 06:09) (70 - 83)  BP: 138/88 (2023 06:09) (124/81 - 146/88)  BP(mean): --  RR: 16 (2023 06:09) (16 - 18)  SpO2: 100% (2023 06:09) (99% - 100%)    Parameters below as of 2023 06:09  Patient On (Oxygen Delivery Method): room air        MEDICATIONS  (STANDING):  acetaminophen     Tablet .. 975 milliGRAM(s) Oral <User Schedule>  diphtheria/tetanus/pertussis (acellular) Vaccine (Adacel) 0.5 milliLiter(s) IntraMuscular once  ibuprofen  Tablet. 600 milliGRAM(s) Oral every 6 hours  ketorolac   Injectable 30 milliGRAM(s) IV Push once  oxytocin Infusion 41.667 milliUNIT(s)/Min (125 mL/Hr) IV Continuous <Continuous>  prenatal multivitamin 1 Tablet(s) Oral daily  sodium chloride 0.9% lock flush 3 milliLiter(s) IV Push every 8 hours    MEDICATIONS  (PRN):  benzocaine 20%/menthol 0.5% Spray 1 Spray(s) Topical every 6 hours PRN for Perineal discomfort  dibucaine 1% Ointment 1 Application(s) Topical every 6 hours PRN Perineal discomfort  diphenhydrAMINE 25 milliGRAM(s) Oral every 6 hours PRN Pruritus  hydrocortisone 1% Cream 1 Application(s) Topical every 6 hours PRN Moderate Pain (4-6)  lanolin Ointment 1 Application(s) Topical every 6 hours PRN nipple soreness  magnesium hydroxide Suspension 30 milliLiter(s) Oral two times a day PRN Constipation  oxyCODONE    IR 5 milliGRAM(s) Oral once PRN Moderate to Severe Pain (4-10)  oxyCODONE    IR 5 milliGRAM(s) Oral every 3 hours PRN Moderate to Severe Pain (4-10)  pramoxine 1%/zinc 5% Cream 1 Application(s) Topical every 4 hours PRN Moderate Pain (4-6)  simethicone 80 milliGRAM(s) Chew every 4 hours PRN Gas  witch hazel Pads 1 Application(s) Topical every 4 hours PRN Perineal discomfort      Labs:  Blood type: O Positive  Rubella IgG: RPR: Negative                          12.0   8.09 >-----------< 239    (  @ 17:50 )             36.2    23 @ 17:50      136  |  102  |  6<L>  ----------------------------<  71  4.8   |  18<L>  |  0.49<L>        Ca    9.0      2023 17:50    TPro  7.1  /  Alb  3.6  /  TBili  0.3  /  DBili  x   /  AST  40<H>  /  ALT  16  /  AlkPhos  252<H>  23 @ 17:50          Physical Exam:  General: NAD  Abdomen: soft, non-tender, non-distended, fundus firm  Vaginal: Lochia wnl  Extremities: No erythema/edema    A/P: 37yo PPD#2 s/p , c/b PEC w/o severe features.  Patient is stable and doing well post-partum.      #PEC  - BP's 138-146 systolic / 82-88 diastolic  - Denies PEC symptoms  - HELLP WNL, P/C 0.3  - RX for BP cuff sent to pt's pharmacy  - instructions given on BP monitoring; TID; call MD office if greater than 140/90; report to emergency room is greater than 160/110  - reviewed signs and symptoms related to preeclampsia with patient such as HA, Change in vision, N/V. RUQ pain   - keep log BP's to present to MD during appointment or triage. Schedule BP check at OB office within 1 week  - All questions answered, patient verbalized understanding     #Postpartum  - Pain well controlled, continue current pain regimen  - Increase ambulation, SCDs when not ambulating  - Continue regular diet  - Discharge planned for today      Kristen TORRESP-BC S: 39yo PPD#2 s/p  c/b PEC w/o severe features (HELLP WNL, P/C 0.3). Patient feels well. Pain is well controlled. She is tolerating a regular diet and passing flatus. She is voiding spontaneously, and ambulating without difficulty. Denies CP/SOB. Denies lightheadedness/dizziness. Denies N/V.        O:  Vitals:   Vital Signs Last 24 Hrs  T(C): 36.5 (2023 06:09), Max: 37.1 (2023 18:24)  T(F): 97.7 (2023 06:09), Max: 98.8 (2023 18:24)  HR: 70 (2023 06:09) (70 - 83)  BP: 138/88 (2023 06:09) (124/81 - 146/88)  BP(mean): --  RR: 16 (2023 06:09) (16 - 18)  SpO2: 100% (2023 06:09) (99% - 100%)    Parameters below as of 2023 06:09  Patient On (Oxygen Delivery Method): room air        MEDICATIONS  (STANDING):  acetaminophen     Tablet .. 975 milliGRAM(s) Oral <User Schedule>  diphtheria/tetanus/pertussis (acellular) Vaccine (Adacel) 0.5 milliLiter(s) IntraMuscular once  ibuprofen  Tablet. 600 milliGRAM(s) Oral every 6 hours  ketorolac   Injectable 30 milliGRAM(s) IV Push once  oxytocin Infusion 41.667 milliUNIT(s)/Min (125 mL/Hr) IV Continuous <Continuous>  prenatal multivitamin 1 Tablet(s) Oral daily  sodium chloride 0.9% lock flush 3 milliLiter(s) IV Push every 8 hours    MEDICATIONS  (PRN):  benzocaine 20%/menthol 0.5% Spray 1 Spray(s) Topical every 6 hours PRN for Perineal discomfort  dibucaine 1% Ointment 1 Application(s) Topical every 6 hours PRN Perineal discomfort  diphenhydrAMINE 25 milliGRAM(s) Oral every 6 hours PRN Pruritus  hydrocortisone 1% Cream 1 Application(s) Topical every 6 hours PRN Moderate Pain (4-6)  lanolin Ointment 1 Application(s) Topical every 6 hours PRN nipple soreness  magnesium hydroxide Suspension 30 milliLiter(s) Oral two times a day PRN Constipation  oxyCODONE    IR 5 milliGRAM(s) Oral once PRN Moderate to Severe Pain (4-10)  oxyCODONE    IR 5 milliGRAM(s) Oral every 3 hours PRN Moderate to Severe Pain (4-10)  pramoxine 1%/zinc 5% Cream 1 Application(s) Topical every 4 hours PRN Moderate Pain (4-6)  simethicone 80 milliGRAM(s) Chew every 4 hours PRN Gas  witch hazel Pads 1 Application(s) Topical every 4 hours PRN Perineal discomfort      Labs:  Blood type: O Positive  Rubella IgG: RPR: Negative                          12.0   8.09 >-----------< 239    (  @ 17:50 )             36.2    23 @ 17:50      136  |  102  |  6<L>  ----------------------------<  71  4.8   |  18<L>  |  0.49<L>        Ca    9.0      2023 17:50    TPro  7.1  /  Alb  3.6  /  TBili  0.3  /  DBili  x   /  AST  40<H>  /  ALT  16  /  AlkPhos  252<H>  23 @ 17:50          Physical Exam:  General: NAD  Abdomen: soft, non-tender, non-distended, fundus firm  Vaginal: Lochia wnl  Extremities: No erythema/edema    A/P: 39yo PPD#2 s/p , c/b PEC w/o severe features.  Patient is stable and doing well post-partum.      #PEC  - BP's 138-146 systolic / 82-88 diastolic  - Denies PEC symptoms  - HELLP WNL, P/C 0.3  - RX for BP cuff sent to pt's pharmacy  - instructions given on BP monitoring; TID; call MD office if greater than 140/90; report to emergency room is greater than 160/110  - reviewed signs and symptoms related to preeclampsia with patient such as HA, Change in vision, N/V. RUQ pain   - keep log BP's to present to MD during appointment or triage. Schedule BP check at OB office within 1 week  - All questions answered, patient verbalized understanding     #Postpartum  - Pain well controlled, continue current pain regimen  - Increase ambulation, SCDs when not ambulating  - Continue regular diet  - Discharge planned for today      Kristen Jones FNP-BC    Attending attestation:  Patient seen and examined at bedside  Agree with the above note and plan  Reviewed home BP monitoring instructions  Josseline Iglesias,

## 2023-06-13 VITALS
OXYGEN SATURATION: 100 % | SYSTOLIC BLOOD PRESSURE: 142 MMHG | DIASTOLIC BLOOD PRESSURE: 90 MMHG | RESPIRATION RATE: 19 BRPM | TEMPERATURE: 99 F | HEART RATE: 80 BPM

## 2023-06-13 RX ORDER — NIFEDIPINE 30 MG
1 TABLET, EXTENDED RELEASE 24 HR ORAL
Qty: 30 | Refills: 1
Start: 2023-06-13 | End: 2023-08-11

## 2023-06-13 RX ORDER — NIFEDIPINE 30 MG
1 TABLET, EXTENDED RELEASE 24 HR ORAL
Qty: 30 | Refills: 0
Start: 2023-06-13

## 2023-06-13 RX ADMIN — Medication 600 MILLIGRAM(S): at 14:15

## 2023-06-13 RX ADMIN — Medication 600 MILLIGRAM(S): at 00:55

## 2023-06-13 RX ADMIN — Medication 30 MILLIGRAM(S): at 17:50

## 2023-06-13 RX ADMIN — Medication 600 MILLIGRAM(S): at 00:21

## 2023-06-13 RX ADMIN — Medication 600 MILLIGRAM(S): at 14:48

## 2023-06-14 ENCOUNTER — NON-APPOINTMENT (OUTPATIENT)
Age: 38
End: 2023-06-14

## 2023-06-14 RX ORDER — INFLUENZA A VIRUS A/VICTORIA/4897/2022 IVR-238 (H1N1) ANTIGEN (FORMALDEHYDE INACTIVATED), INFLUENZA A VIRUS A/DARWIN/9/2021 SAN-010 (H3N2) ANTIGEN (FORMALDEHYDE INACTIVATED), INFLUENZA B VIRUS B/PHUKET/3073/2013 ANTIGEN (FORMALDEHYDE INACTIVATED), AND INFLUENZA B VIRUS B/MICHIGAN/01/2021 ANTIGEN (FORMALDEHYDE INACTIVATED) 15; 15; 15; 15 UG/.5ML; UG/.5ML; UG/.5ML; UG/.5ML
0.5 INJECTION, SUSPENSION INTRAMUSCULAR
Refills: 0 | Status: DISCONTINUED | COMMUNITY
Start: 2022-11-01 | End: 2023-06-14

## 2023-06-14 RX ORDER — DOCUSATE SODIUM 100 MG/1
100 CAPSULE ORAL TWICE DAILY
Qty: 120 | Refills: 2 | Status: DISCONTINUED | COMMUNITY
Start: 2023-03-28 | End: 2023-06-14

## 2023-06-14 RX ORDER — PRENATAL VIT NO.126/IRON/FOLIC 28MG-0.8MG
28-0.8 TABLET ORAL DAILY
Refills: 0 | Status: ACTIVE | COMMUNITY
Start: 2023-06-14

## 2023-06-14 RX ORDER — POLYETHYLENE GLYCOL 3350 17 G/17G
17 POWDER, FOR SOLUTION ORAL
Qty: 1 | Refills: 0 | Status: DISCONTINUED | COMMUNITY
Start: 2023-03-28 | End: 2023-06-14

## 2023-06-16 ENCOUNTER — NON-APPOINTMENT (OUTPATIENT)
Age: 38
End: 2023-06-16

## 2023-06-20 ENCOUNTER — NON-APPOINTMENT (OUTPATIENT)
Age: 38
End: 2023-06-20

## 2023-06-20 ENCOUNTER — APPOINTMENT (OUTPATIENT)
Dept: OBGYN | Facility: CLINIC | Age: 38
End: 2023-06-20
Payer: COMMERCIAL

## 2023-06-20 VITALS
SYSTOLIC BLOOD PRESSURE: 114 MMHG | WEIGHT: 169 LBS | HEIGHT: 68 IN | BODY MASS INDEX: 25.61 KG/M2 | DIASTOLIC BLOOD PRESSURE: 72 MMHG

## 2023-06-20 DIAGNOSIS — Z3A.30 30 WEEKS GESTATION OF PREGNANCY: ICD-10-CM

## 2023-06-20 DIAGNOSIS — N64.4 MASTODYNIA: ICD-10-CM

## 2023-06-20 DIAGNOSIS — Z3A.15 15 WEEKS GESTATION OF PREGNANCY: ICD-10-CM

## 2023-06-20 DIAGNOSIS — Z87.59 PERSONAL HISTORY OF OTHER COMPLICATIONS OF PREGNANCY, CHILDBIRTH AND THE PUERPERIUM: ICD-10-CM

## 2023-06-20 DIAGNOSIS — O09.529 SUPERVISION OF ELDERLY MULTIGRAVIDA, UNSPECIFIED TRIMESTER: ICD-10-CM

## 2023-06-20 DIAGNOSIS — Z3A.38 38 WEEKS GESTATION OF PREGNANCY: ICD-10-CM

## 2023-06-20 DIAGNOSIS — Z3A.11 11 WEEKS GESTATION OF PREGNANCY: ICD-10-CM

## 2023-06-20 DIAGNOSIS — Z3A.23 23 WEEKS GESTATION OF PREGNANCY: ICD-10-CM

## 2023-06-20 DIAGNOSIS — R12 OTHER SPECIFIED PREGNANCY RELATED CONDITIONS, UNSPECIFIED TRIMESTER: ICD-10-CM

## 2023-06-20 DIAGNOSIS — O99.019 ANEMIA COMPLICATING PREGNANCY, UNSPECIFIED TRIMESTER: ICD-10-CM

## 2023-06-20 DIAGNOSIS — Z3A.19 19 WEEKS GESTATION OF PREGNANCY: ICD-10-CM

## 2023-06-20 DIAGNOSIS — Z92.29 PERSONAL HISTORY OF OTHER DRUG THERAPY: ICD-10-CM

## 2023-06-20 DIAGNOSIS — Z32.01 ENCOUNTER FOR PREGNANCY TEST, RESULT POSITIVE: ICD-10-CM

## 2023-06-20 DIAGNOSIS — Z3A.27 27 WEEKS GESTATION OF PREGNANCY: ICD-10-CM

## 2023-06-20 DIAGNOSIS — Z3A.37 37 WEEKS GESTATION OF PREGNANCY: ICD-10-CM

## 2023-06-20 DIAGNOSIS — Z3A.36 36 WEEKS GESTATION OF PREGNANCY: ICD-10-CM

## 2023-06-20 DIAGNOSIS — O26.899 OTHER SPECIFIED PREGNANCY RELATED CONDITIONS, UNSPECIFIED TRIMESTER: ICD-10-CM

## 2023-06-20 DIAGNOSIS — Z23 ENCOUNTER FOR IMMUNIZATION: ICD-10-CM

## 2023-06-20 DIAGNOSIS — O36.80X0 PREGNANCY WITH INCONCLUSIVE FETAL VIABILITY, NOT APPLICABLE OR UNSPECIFIED: ICD-10-CM

## 2023-06-20 DIAGNOSIS — Z3A.32 32 WEEKS GESTATION OF PREGNANCY: ICD-10-CM

## 2023-06-20 DIAGNOSIS — O99.810 ABNORMAL GLUCOSE COMPLICATING PREGNANCY: ICD-10-CM

## 2023-06-20 DIAGNOSIS — Z3A.34 34 WEEKS GESTATION OF PREGNANCY: ICD-10-CM

## 2023-06-20 DIAGNOSIS — Z36.89 ENCOUNTER FOR OTHER SPECIFIED ANTENATAL SCREENING: ICD-10-CM

## 2023-06-20 DIAGNOSIS — O36.63X0 MATERNAL CARE FOR EXCESSIVE FETAL GROWTH, THIRD TRIMESTER, NOT APPLICABLE OR UNSPECIFIED: ICD-10-CM

## 2023-06-20 PROCEDURE — 0503F POSTPARTUM CARE VISIT: CPT

## 2023-06-20 NOTE — HISTORY OF PRESENT ILLNESS
[Postpartum Follow Up] : postpartum follow up [Complications:___] : complications include: [unfilled] [Delivery Date: ___] : on [unfilled] [] : delivered by vaginal delivery [Female] : Delivery History: baby girl [Wt. ___] : weighing [unfilled] [Breastfeeding] : currently nursing [Back to Normal] : is back to normal in size [None] : no vaginal bleeding [Normal] : the vagina was normal [Not Done] : Examination of breasts not done [Doing Well] : is doing well [No Sign of Infection] : is showing no signs of infection [No [place]] : to avoid sexual intercourse [Limited ADLs] : to participate in activities of daily living with limitations [Limited Work] : to work with limitations [Limited Housework] : to do housework with limitations [Limited Sports___] : to participate in sports with limitations~U: [unfilled] [de-identified] : 38 weeks [de-identified] : Stopped nifedipine.  Call with BP >140/90, return to hospital >160/110

## 2023-06-28 ENCOUNTER — NON-APPOINTMENT (OUTPATIENT)
Age: 38
End: 2023-06-28

## 2023-07-05 ENCOUNTER — NON-APPOINTMENT (OUTPATIENT)
Age: 38
End: 2023-07-05

## 2023-07-11 ENCOUNTER — NON-APPOINTMENT (OUTPATIENT)
Age: 38
End: 2023-07-11

## 2023-07-12 ENCOUNTER — NON-APPOINTMENT (OUTPATIENT)
Age: 38
End: 2023-07-12

## 2023-07-12 DIAGNOSIS — G43.909 MIGRAINE, UNSPECIFIED, NOT INTRACTABLE, W/OUT STATUS MIGRAINOSUS: ICD-10-CM

## 2023-07-12 DIAGNOSIS — Z87.09 PERSONAL HISTORY OF OTHER DISEASES OF THE RESPIRATORY SYSTEM: ICD-10-CM

## 2023-07-12 RX ORDER — CHLORHEXIDINE GLUCONATE 4 %
325 (65 FE) LIQUID (ML) TOPICAL TWICE DAILY
Qty: 60 | Refills: 5 | Status: DISCONTINUED | COMMUNITY
Start: 2023-03-29 | End: 2023-07-12

## 2023-07-12 RX ORDER — PANTOPRAZOLE 40 MG/1
40 TABLET, DELAYED RELEASE ORAL
Qty: 30 | Refills: 1 | Status: DISCONTINUED | COMMUNITY
Start: 2023-03-28 | End: 2023-07-12

## 2023-07-12 RX ORDER — NIFEDIPINE 30 MG/1
30 TABLET, FILM COATED, EXTENDED RELEASE ORAL DAILY
Refills: 0 | Status: DISCONTINUED | COMMUNITY
Start: 2023-06-14 | End: 2023-07-12

## 2023-07-17 ENCOUNTER — APPOINTMENT (OUTPATIENT)
Dept: OBGYN | Facility: CLINIC | Age: 38
End: 2023-07-17
Payer: COMMERCIAL

## 2023-07-17 VITALS
SYSTOLIC BLOOD PRESSURE: 142 MMHG | BODY MASS INDEX: 25.31 KG/M2 | DIASTOLIC BLOOD PRESSURE: 80 MMHG | WEIGHT: 167 LBS | HEIGHT: 68 IN

## 2023-07-17 PROCEDURE — 0503F POSTPARTUM CARE VISIT: CPT

## 2023-07-17 NOTE — HISTORY OF PRESENT ILLNESS
[Postpartum Follow Up] : postpartum follow up [Delivery Date: ___] : on [unfilled] [] : delivered by vaginal delivery [Wt. ___] : weighing [unfilled] [Back to Normal] : is back to normal in size [Mild] : mild vaginal bleeding [Normal] : the vagina was normal [Not Done] : Examination of breasts not done [No Sign of Infection] : is showing no signs of infection [Excellent Pain Control] : has excellent pain control [None] : None [Doing Well] : is doing well [Complications:___] : no complications [BTL] : no tubal ligation [Breastfeeding] : not currently nursing [Intended Contraception] : the patient does not intended to use contraception postpartum [S/Sx PP Depression] : no signs/symptoms of postpartum depression [Erythema] : not erythematous [Cervix Sample Taken] : cervical sample not taken for a Pap smear [de-identified] : RTO in 3 months for annual exam

## 2023-07-18 ENCOUNTER — NON-APPOINTMENT (OUTPATIENT)
Age: 38
End: 2023-07-18

## 2023-07-18 ENCOUNTER — APPOINTMENT (OUTPATIENT)
Dept: CARDIOLOGY | Facility: CLINIC | Age: 38
End: 2023-07-18
Payer: COMMERCIAL

## 2023-07-18 VITALS
HEIGHT: 68 IN | WEIGHT: 170 LBS | DIASTOLIC BLOOD PRESSURE: 84 MMHG | HEART RATE: 66 BPM | SYSTOLIC BLOOD PRESSURE: 120 MMHG | BODY MASS INDEX: 25.76 KG/M2 | OXYGEN SATURATION: 100 %

## 2023-07-18 PROCEDURE — 99203 OFFICE O/P NEW LOW 30 MIN: CPT | Mod: 25

## 2023-07-18 PROCEDURE — 93000 ELECTROCARDIOGRAM COMPLETE: CPT

## 2023-07-18 NOTE — HISTORY OF PRESENT ILLNESS
[FreeTextEntry1] : Ms. ALVINO HARDY 38 year old  s/p  on 6/10 @ 38 weeks was diagnosed with sPEC   is here 2023 to establish care in the [facility]. Patient carries a strong FH of heart disease ( mom with MI ) and HTN and personal history of spontaneous pneumothorax s/p chest tube, and thoracotomy due to retained piece of glove in lungs, sPEC not on meds. At the present time denies chest pain, shortness of breath, dizziness, lightheadedness, palpitations or near syncope or syncope, orthopnea, PND and increasing lower extremity edema. \par  BP logs : 116-131/ 76-86\par \par # sPEC: BP Stable \par Encouraged Patient to monitor BP at home and keep a log and report results back to us for evaluation. Based on results, we will adjust medications as necessary. \par Additionally, encouraged heart healthy diet and exercise as tolerated.\par EKG with no acute changes.\par

## 2023-07-18 NOTE — REVIEW OF SYSTEMS
[Negative] : Heme/Lymph
Pt denies having DM2, will check medications in the am  - f/u Hgb A1C  - will place on ISS for now while on decadron

## 2023-07-18 NOTE — DISCUSSION/SUMMARY
[EKG obtained to assist in diagnosis and management of assessed problem(s)] : EKG obtained to assist in diagnosis and management of assessed problem(s) [FreeTextEntry1] : In summary, Ms. ALVINO HARDY 38 year old  s/p  on 6/10 @ 38 weeks carries a strong FH of heart disease ( mom with MI ) and HTN and personal history of  spontaneous pneumothorax s/p chest tube ( ), and thoracotomy due to retained piece of glove in lungs ( ), sPEC not on meds presents in to establish care in the [facility] program as she was diagnosed with sPEC. \par \par # sPEC: BP Stable \par Encouraged Patient to monitor BP at home and keep a log and report results back to us for evaluation. Based on results, we will adjust medications as necessary. \par Additionally, encouraged heart healthy diet and exercise as tolerated.\par EKG with no acute changes.\par

## 2023-09-25 NOTE — OB RN PATIENT PROFILE - NS_ADMITDT_OBGYN_ALL_OB_DT
· Admitted to [facility] at the end of August and discharge to short term rehab, afterwards patient went back to living unsheltered x since that time  · Patient reports that she has been trying to get into a senior apartment, but they are all full  · She reports difficulty finding a homeless shelter that will accommodate her wheelchair so she has been living outside  · Patient reports she tries to be compliant with medications, but it is sometimes difficult secondary to her situation  · Case management consulted 09-Jun-2023 18:00

## 2023-10-03 ENCOUNTER — APPOINTMENT (OUTPATIENT)
Dept: OBGYN | Facility: CLINIC | Age: 38
End: 2023-10-03

## 2023-11-08 NOTE — ED CDU PROVIDER SUBSEQUENT DAY NOTE - EYES NEGATIVE STATEMENT, MLM
Caller: Georgie Winston    Relationship to patient: Self    Best call back number: [phone]    Chief complaint: BLADDER COCKTAIL    Type of visit: BLADDER COCKTAIL    Requested date: ANY TIME BETWEEN 10AM AND 2PM, NEXT WEEK.        Additional notes: PLEASE CALL TO SCHEDULE, UNABLE TO WARM [place].           no discharge, no irritation, no pain, no redness, and no visual changes.

## 2023-11-14 ENCOUNTER — APPOINTMENT (OUTPATIENT)
Dept: OBGYN | Facility: CLINIC | Age: 38
End: 2023-11-14
Payer: COMMERCIAL

## 2023-11-14 VITALS
BODY MASS INDEX: 25.16 KG/M2 | WEIGHT: 166 LBS | SYSTOLIC BLOOD PRESSURE: 112 MMHG | HEIGHT: 68 IN | DIASTOLIC BLOOD PRESSURE: 70 MMHG

## 2023-11-14 DIAGNOSIS — Z01.419 ENCOUNTER FOR GYNECOLOGICAL EXAMINATION (GENERAL) (ROUTINE) W/OUT ABNORMAL FINDINGS: ICD-10-CM

## 2023-11-14 DIAGNOSIS — Z87.59 PERSONAL HISTORY OF OTHER COMPLICATIONS OF PREGNANCY, CHILDBIRTH AND THE PUERPERIUM: ICD-10-CM

## 2023-11-14 PROCEDURE — 99395 PREV VISIT EST AGE 18-39: CPT

## 2023-11-16 LAB — HPV HIGH+LOW RISK DNA PNL CVX: NOT DETECTED

## 2023-11-20 LAB — CYTOLOGY CVX/VAG DOC THIN PREP: NORMAL

## 2024-02-23 ENCOUNTER — APPOINTMENT (OUTPATIENT)
Dept: CARDIOLOGY | Facility: CLINIC | Age: 39
End: 2024-02-23

## 2024-07-08 NOTE — OB RN DELIVERY SUMMARY - NSSTERILIZETYPE_OBGYN_ALL_OB
Render In Strict Bullet Format?: No Continue Regimen: clobetasol 0.05 % scalp solution QHS: Apply to scalp and massage in QHS x 2 weeks on, 1 week off, and repeat cycle until hair regrowth is seen and itching improves. Detail Level: Zone None

## 2024-08-22 NOTE — OB PROVIDER H&P - NS_FETALPRESENTATIONA_OBGYN_ALL_OB
In an effort to ensure that our patients [employer], a Team Member has reviewed your chart and identified an opportunity to provide the best care possible. An attempt was made to discuss or schedule due or overdue Preventive or Chronic Condition care.    The Outcome was Contact was not made, left message. We are attempting to schedule a mammogram appointment. If you have any questions or need help with scheduling, contact our Health Outreach Team at [phone]. Care Gaps identified: Breast Cancer Screening.    Appointment needed:  Mammo    Services coordinated include: None.  2 attempts made to schedule Mammogram   
Cephalic

## 2024-11-01 ENCOUNTER — APPOINTMENT (OUTPATIENT)
Dept: OBGYN | Facility: CLINIC | Age: 39
End: 2024-11-01

## 2024-11-11 ENCOUNTER — APPOINTMENT (OUTPATIENT)
Dept: CARDIOLOGY | Facility: CLINIC | Age: 39
End: 2024-11-11
Payer: COMMERCIAL

## 2024-11-11 ENCOUNTER — NON-APPOINTMENT (OUTPATIENT)
Age: 39
End: 2024-11-11

## 2024-11-11 VITALS
HEART RATE: 71 BPM | OXYGEN SATURATION: 100 % | WEIGHT: 155 LBS | BODY MASS INDEX: 23.49 KG/M2 | HEIGHT: 68 IN | SYSTOLIC BLOOD PRESSURE: 118 MMHG | DIASTOLIC BLOOD PRESSURE: 87 MMHG

## 2024-11-11 DIAGNOSIS — R00.2 PALPITATIONS: ICD-10-CM

## 2024-11-11 DIAGNOSIS — R06.02 SHORTNESS OF BREATH: ICD-10-CM

## 2024-11-11 PROCEDURE — 93246 EXT ECG>7D<15D RECORDING: CPT

## 2024-11-11 PROCEDURE — G2211 COMPLEX E/M VISIT ADD ON: CPT | Mod: NC

## 2024-11-11 PROCEDURE — 99214 OFFICE O/P EST MOD 30 MIN: CPT

## 2024-12-03 ENCOUNTER — OUTPATIENT (OUTPATIENT)
Dept: OUTPATIENT SERVICES | Facility: HOSPITAL | Age: 39
LOS: 1 days | End: 2024-12-03

## 2024-12-03 ENCOUNTER — RESULT REVIEW (OUTPATIENT)
Age: 39
End: 2024-12-03

## 2024-12-03 ENCOUNTER — APPOINTMENT (OUTPATIENT)
Dept: CV DIAGNOSITCS | Facility: HOSPITAL | Age: 39
End: 2024-12-03

## 2024-12-03 DIAGNOSIS — R00.2 PALPITATIONS: ICD-10-CM

## 2024-12-03 DIAGNOSIS — R06.02 SHORTNESS OF BREATH: ICD-10-CM

## 2024-12-03 LAB — HCG UR QL: NEGATIVE — SIGNIFICANT CHANGE UP

## 2024-12-03 PROCEDURE — 93306 TTE W/DOPPLER COMPLETE: CPT | Mod: 26,59

## 2024-12-03 PROCEDURE — 93351 STRESS TTE COMPLETE: CPT | Mod: 26,52

## 2025-01-01 ENCOUNTER — NON-APPOINTMENT (OUTPATIENT)
Age: 40
End: 2025-01-01

## 2025-01-14 ENCOUNTER — NON-APPOINTMENT (OUTPATIENT)
Age: 40
End: 2025-01-14

## 2025-01-14 ENCOUNTER — APPOINTMENT (OUTPATIENT)
Dept: OBGYN | Facility: CLINIC | Age: 40
End: 2025-01-14
Payer: COMMERCIAL

## 2025-01-14 VITALS
HEIGHT: 68 IN | WEIGHT: 157 LBS | SYSTOLIC BLOOD PRESSURE: 128 MMHG | BODY MASS INDEX: 23.79 KG/M2 | DIASTOLIC BLOOD PRESSURE: 86 MMHG

## 2025-01-14 DIAGNOSIS — N92.0 EXCESSIVE AND FREQUENT MENSTRUATION WITH REGULAR CYCLE: ICD-10-CM

## 2025-01-14 DIAGNOSIS — Z30.09 ENCOUNTER FOR OTHER GENERAL COUNSELING AND ADVICE ON CONTRACEPTION: ICD-10-CM

## 2025-01-14 DIAGNOSIS — Z01.419 ENCOUNTER FOR GYNECOLOGICAL EXAMINATION (GENERAL) (ROUTINE) W/OUT ABNORMAL FINDINGS: ICD-10-CM

## 2025-01-14 PROCEDURE — 99459 PELVIC EXAMINATION: CPT

## 2025-01-14 PROCEDURE — 99395 PREV VISIT EST AGE 18-39: CPT

## 2025-01-14 RX ORDER — LEVONORGESTREL AND ETHINYL ESTRADIOL 0.1-0.02MG
0.1-2 KIT ORAL DAILY
Qty: 3 | Refills: 1 | Status: ACTIVE | COMMUNITY
Start: 2025-01-14 | End: 1900-01-01

## 2025-01-17 LAB — HPV HIGH+LOW RISK DNA PNL CVX: NOT DETECTED

## 2025-01-20 LAB — CYTOLOGY CVX/VAG DOC THIN PREP: NORMAL

## 2025-04-04 NOTE — OB RN PATIENT PROFILE - FUNCTIONAL ASSESSMENT - DAILY ACTIVITY ASSESSMENT TYPE
AST/ALT elevated to 140/148 on admission, which is increased from 61/68 on discharge from [facility] on 04/01. Patient with history of Hep C antibody reactive with imaging findings of hepatic steatosis (US 3/30 and CT AP on 3/29). Recent RUQ ultrasound stable. Continue to monitor, trend CMP daily.     Hepatitis C antibody reactive on admission. On 11/4/2021 Hep C ab+, viral load negative.    -Consider PCR    Admission

## 2025-05-13 ENCOUNTER — APPOINTMENT (OUTPATIENT)
Dept: OBGYN | Facility: CLINIC | Age: 40
End: 2025-05-13

## 2025-07-07 ENCOUNTER — APPOINTMENT (OUTPATIENT)
Dept: OBGYN | Facility: CLINIC | Age: 40
End: 2025-07-07
Payer: COMMERCIAL

## 2025-07-07 VITALS
SYSTOLIC BLOOD PRESSURE: 119 MMHG | BODY MASS INDEX: 23.95 KG/M2 | HEIGHT: 68 IN | DIASTOLIC BLOOD PRESSURE: 80 MMHG | WEIGHT: 158 LBS

## 2025-07-07 PROCEDURE — 99213 OFFICE O/P EST LOW 20 MIN: CPT
